# Patient Record
Sex: MALE | Race: WHITE | NOT HISPANIC OR LATINO | Employment: FULL TIME | URBAN - METROPOLITAN AREA
[De-identification: names, ages, dates, MRNs, and addresses within clinical notes are randomized per-mention and may not be internally consistent; named-entity substitution may affect disease eponyms.]

---

## 2017-11-09 ENCOUNTER — HOSPITAL ENCOUNTER (OUTPATIENT)
Facility: MEDICAL CENTER | Age: 42
End: 2017-11-09
Attending: FAMILY MEDICINE
Payer: COMMERCIAL

## 2017-11-09 ENCOUNTER — OFFICE VISIT (OUTPATIENT)
Dept: URGENT CARE | Facility: PHYSICIAN GROUP | Age: 42
End: 2017-11-09
Payer: COMMERCIAL

## 2017-11-09 VITALS
WEIGHT: 207 LBS | TEMPERATURE: 98.9 F | SYSTOLIC BLOOD PRESSURE: 140 MMHG | OXYGEN SATURATION: 94 % | HEART RATE: 89 BPM | HEIGHT: 69 IN | BODY MASS INDEX: 30.66 KG/M2 | DIASTOLIC BLOOD PRESSURE: 90 MMHG | RESPIRATION RATE: 16 BRPM

## 2017-11-09 DIAGNOSIS — L02.91 ABSCESS: ICD-10-CM

## 2017-11-09 PROCEDURE — 87186 SC STD MICRODIL/AGAR DIL: CPT

## 2017-11-09 PROCEDURE — 10060 I&D ABSCESS SIMPLE/SINGLE: CPT | Performed by: FAMILY MEDICINE

## 2017-11-09 PROCEDURE — 87070 CULTURE OTHR SPECIMN AEROBIC: CPT

## 2017-11-09 RX ORDER — OXYCODONE AND ACETAMINOPHEN 10; 325 MG/1; MG/1
1-2 TABLET ORAL EVERY 4 HOURS PRN
COMMUNITY
End: 2021-06-22

## 2017-11-09 RX ORDER — SULFAMETHOXAZOLE AND TRIMETHOPRIM 800; 160 MG/1; MG/1
1 TABLET ORAL EVERY 12 HOURS
Qty: 20 TAB | Refills: 0 | Status: SHIPPED | OUTPATIENT
Start: 2017-11-09 | End: 2017-11-19

## 2017-11-11 DIAGNOSIS — L02.91 ABSCESS: ICD-10-CM

## 2017-11-12 ASSESSMENT — ENCOUNTER SYMPTOMS
SORE THROAT: 0
MYALGIAS: 0
WEIGHT LOSS: 0
NECK PAIN: 0
HEADACHES: 0

## 2017-11-13 LAB
BACTERIA WND AEROBE CULT: ABNORMAL
SIGNIFICANT IND 70042: ABNORMAL
SITE SITE: ABNORMAL
SOURCE SOURCE: ABNORMAL

## 2017-11-13 NOTE — PROGRESS NOTES
"Subjective:      Viktor Mesa is a 42 y.o. male who presents with Abscess (clear liquid, radiating pain, behind left ear, )            Seen 11/9 for 2 days of progressively worse pain, swelling, and suspected abscess behind left ear. No fever. No PMH abscess or MRSA. He suspected initial insect bite but not witnessed. It itched and he scratched it. Scant clear drainage. No other aggravating or alleviating factors.          Review of Systems   Constitutional: Negative for malaise/fatigue and weight loss.   HENT: Negative for sore throat.    Musculoskeletal: Negative for myalgias and neck pain.   Skin: Negative for rash.   Neurological: Negative for headaches.          Objective:     /90   Pulse 89   Temp 37.2 °C (98.9 °F)   Resp 16   Ht 1.753 m (5' 9\")   Wt 93.9 kg (207 lb)   SpO2 94%   BMI 30.57 kg/m²      Physical Exam   Constitutional: He appears well-developed and well-nourished. No distress.   HENT:   Head: Normocephalic and atraumatic.       Right Ear: External ear normal.   Left Ear: External ear normal.   Eyes: Conjunctivae are normal.   Neck: Neck supple.   Cardiovascular: Normal rate, regular rhythm and normal heart sounds.    Pulmonary/Chest: Effort normal and breath sounds normal.   Lymphadenopathy:     He has no cervical adenopathy.   Neurological:   Speech is clear. Patient is appropriate and cooperative.               Procedure: Incision and Drainage  -Risks, benefits, and alternatives discussed. Risks including infection, bleeding, nerve damage, and poor cosmetic outcome  -Sterile technique throughout  -Local anesthesia with 2% lidocaine with epinephrine  -Incision with #11 blade into fluctuant area with purulent material expressed  -Culture obtained and packaged for lab  -Cavity probed and any loculations bluntly taken down with hemostat  -Irrigated copiously with NS  -Minimal bleeding with good hemostasis achieved  -The patient tolerated the procedure well      Assessment/Plan: "     1. Abscess    - sulfamethoxazole-trimethoprim (BACTRIM DS) 800-160 MG tablet; Take 1 Tab by mouth every 12 hours for 10 days.  Dispense: 20 Tab; Refill: 0  - CULTURE WOUND W/O GRAM STAIN; Future  - will f/u culture

## 2017-11-14 ENCOUNTER — TELEPHONE (OUTPATIENT)
Dept: URGENT CARE | Facility: PHYSICIAN GROUP | Age: 42
End: 2017-11-14

## 2019-09-27 ENCOUNTER — OFFICE VISIT (OUTPATIENT)
Dept: BEHAVIORAL HEALTH | Facility: CLINIC | Age: 44
End: 2019-09-27
Payer: COMMERCIAL

## 2019-09-27 DIAGNOSIS — F10.20 ALCOHOL USE DISORDER, MODERATE, DEPENDENCE (HCC): ICD-10-CM

## 2019-09-27 DIAGNOSIS — G89.29 OTHER CHRONIC PAIN: ICD-10-CM

## 2019-09-27 PROCEDURE — 90791 PSYCH DIAGNOSTIC EVALUATION: CPT | Performed by: SOCIAL WORKER

## 2019-09-27 SDOH — HEALTH STABILITY: MENTAL HEALTH: HOW OFTEN DO YOU HAVE 6 OR MORE DRINKS ON ONE OCCASION?: WEEKLY

## 2019-09-27 SDOH — HEALTH STABILITY: MENTAL HEALTH: HOW MANY STANDARD DRINKS CONTAINING ALCOHOL DO YOU HAVE ON A TYPICAL DAY?: 7 TO 9

## 2019-09-27 SDOH — HEALTH STABILITY: MENTAL HEALTH: HOW OFTEN DO YOU HAVE A DRINK CONTAINING ALCOHOL?: 2-3 TIMES A WEEK
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
## 2019-10-11 ENCOUNTER — OFFICE VISIT (OUTPATIENT)
Dept: BEHAVIORAL HEALTH | Facility: CLINIC | Age: 44
End: 2019-10-11
Payer: COMMERCIAL

## 2019-10-11 DIAGNOSIS — G89.4 CHRONIC PAIN SYNDROME: ICD-10-CM

## 2019-10-11 DIAGNOSIS — F10.10 ALCOHOL ABUSE, DAILY USE: ICD-10-CM

## 2019-10-11 PROCEDURE — 90834 PSYTX W PT 45 MINUTES: CPT | Performed by: PSYCHOLOGIST
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
## 2019-11-08 ENCOUNTER — OFFICE VISIT (OUTPATIENT)
Dept: BEHAVIORAL HEALTH | Facility: CLINIC | Age: 44
End: 2019-11-08
Payer: COMMERCIAL

## 2019-11-08 DIAGNOSIS — F10.10 ALCOHOL ABUSE, EPISODIC: ICD-10-CM

## 2019-11-08 DIAGNOSIS — G89.4 CHRONIC PAIN SYNDROME: ICD-10-CM

## 2019-11-08 PROCEDURE — 90834 PSYTX W PT 45 MINUTES: CPT | Performed by: PSYCHOLOGIST

## 2019-11-09 NOTE — BH THERAPY
" [facility]  Therapy Progress Note    Patient Name: Viktor Mesa  Patient MRN: [id number]  Today's Date: 11/8/2019     Type of session:Individual psychotherapy  Length of session: 45 minutes  Persons in attendance:Patient    Subjective/New Info: Patient reports that he has been trying to cut down on drinking less and said that he does not drink all the time and sometimes feels his girlfriend overly magnifies the problem.  Patient went into the description of his girlfriend as having a tragic traumatic history, entering in and out of bad substance abusing relationships, and creating a lot of trauma and being a little \"crazy\" herself.  Examined whether his drinking might be a trigger to some of the bad things that is happened to her, even though he does not think his drinking is that out rages nor is he ever aggressive with her.  Patient went on about the trauma that patient tends to create and inquired into why patient finds himself attracted to \"crazy\" women.    Objective/Observations:   Participation: Active verbal participation, Attentive and Engaged   Grooming: Casual   Cognition: Alert and Fully Oriented   Eye contact: Good   Mood: Euthymic   Affect: Congruent with content   Thought process: Logical and Goal-directed   Speech: Rate within normal limits   Other:     Diagnoses:   1. Alcohol abuse, episodic    2. Chronic pain syndrome         Current risk:   SUICIDE: Low   Homicide: Not applicable   Self-harm: Not applicable   Relapse: Not applicable   Other:    Safety Plan reviewed? Not Indicated   If evidence of imminent risk is present, intervention/plan:     Therapeutic Intervention(s): Clarify:  Clarify feelings and Clarify thoughts, Cognitive modification, Communication skills, Parenting/familial roles addressed, Stressors assessed and Supportive psychotherapy    Treatment Goal(s)/Objective(s) addressed: Tx Goals:  - Learn to be more emotionally flexible/resilient in times of " stress/challenges  - Work an identified program of recovery or harm reduction & relapse prevention  - Successfully learn to regulate impulsive behaviors  - Learn to utilize mind/body techniques toward reducing pain experience       Progress toward Treatment Goals: No change    Plan:  - Continue Individual therapy    Monique Sorot, Ph.D.  11/8/2019    This dictation has been created using voice recognition software and/or scribes. The accuracy of the dictation is limited by the abilities of the software and the expertise of the scribes. I expect there may be some errors of grammar and possibly content. I made every attempt to manually correct the errors within my dictation. However, errors related to voice recognition software and/or scribes may still exist and should be interpreted within the appropriate context.

## 2019-11-19 ENCOUNTER — OFFICE VISIT (OUTPATIENT)
Dept: BEHAVIORAL HEALTH | Facility: CLINIC | Age: 44
End: 2019-11-19
Payer: COMMERCIAL

## 2019-11-19 DIAGNOSIS — G89.4 CHRONIC PAIN SYNDROME: ICD-10-CM

## 2019-11-19 DIAGNOSIS — F10.10 ALCOHOL ABUSE, EPISODIC: ICD-10-CM

## 2019-11-19 PROCEDURE — 90834 PSYTX W PT 45 MINUTES: CPT | Performed by: PSYCHOLOGIST

## 2019-11-20 NOTE — BH THERAPY
[facility]  Therapy Progress Note    Patient Name: Viktor Mesa  Patient MRN: [id number]  Today's Date: 11/19/2019     Type of session:Individual psychotherapy  Length of session: 45 minutes  Persons in attendance:Patient    Subjective/New Info: Explored with patient the goals that he had for therapy, which patient had a hard time identifying.  Patient continues to state that he is in therapy mostly because his girlfriend says his traumatic past is affecting the relationship however patient feels like it is some projection on her part and it is actually her traumatic past that has been affecting the relationship.  Examined patient's drinking which can look like binge drinking at times, however patient does not see this as a problem and states that he is cut back in the last time he did any serious drinking was 3 weeks ago.  Provided psychoeducation about health impact of excessive alcohol use.  Patient is going to think about what he wants to work on in therapy and will decide next session if he wants to continue or not.  Suggested that may be he and his girlfriend go into couples therapy.    Objective/Observations:   Participation: Active verbal participation, Attentive and Engaged   Grooming: Casual   Cognition: Alert and Fully Oriented   Eye contact: Good   Mood: Euthymic   Affect: Congruent with content   Thought process: Logical and Goal-directed   Speech: Rate within normal limits   Other:     Diagnoses:   1. Alcohol abuse, episodic    2. Chronic pain syndrome         Current risk:   SUICIDE: Low   Homicide: Not applicable   Self-harm: Not applicable   Relapse: Not applicable   Other:    Safety Plan reviewed? Not Indicated   If evidence of imminent risk is present, intervention/plan:     Therapeutic Intervention(s): Clarify:  Clarify feelings and Clarify thoughts, Cognitive modification, Communication skills, Parenting/familial roles addressed, Stressors assessed and Supportive  psychotherapy    Treatment Goal(s)/Objective(s) addressed: Tx Goals:  - Learn to be more emotionally flexible/resilient in times of stress/challenges  - Work an identified program of recovery or harm reduction & relapse prevention  - Successfully learn to regulate impulsive behaviors  - Learn to utilize mind/body techniques toward reducing pain experience       Progress toward Treatment Goals: No change    Plan:  - Continue Individual therapy    Monique Sorto, Ph.D.  11/19/2019    This dictation has been created using voice recognition software and/or scribes. The accuracy of the dictation is limited by the abilities of the software and the expertise of the scribes. I expect there may be some errors of grammar and possibly content. I made every attempt to manually correct the errors within my dictation. However, errors related to voice recognition software and/or scribes may still exist and should be interpreted within the appropriate context.

## 2019-12-20 ENCOUNTER — APPOINTMENT (OUTPATIENT)
Dept: BEHAVIORAL HEALTH | Facility: CLINIC | Age: 44
End: 2019-12-20
Payer: COMMERCIAL

## 2020-12-23 ENCOUNTER — HOSPITAL ENCOUNTER (OUTPATIENT)
Dept: LAB | Facility: MEDICAL CENTER | Age: 45
End: 2020-12-23
Attending: ANESTHESIOLOGY
Payer: COMMERCIAL

## 2020-12-23 LAB
COVID ORDER STATUS COVID19: NORMAL
SARS-COV-2 RNA RESP QL NAA+PROBE: NOTDETECTED
SPECIMEN SOURCE: NORMAL

## 2020-12-23 PROCEDURE — U0003 INFECTIOUS AGENT DETECTION BY NUCLEIC ACID (DNA OR RNA); SEVERE ACUTE RESPIRATORY SYNDROME CORONAVIRUS 2 (SARS-COV-2) (CORONAVIRUS DISEASE [COVID-19]), AMPLIFIED PROBE TECHNIQUE, MAKING USE OF HIGH THROUGHPUT TECHNOLOGIES AS DESCRIBED BY CMS-2020-01-R: HCPCS

## 2020-12-23 PROCEDURE — C9803 HOPD COVID-19 SPEC COLLECT: HCPCS

## 2021-06-16 PROBLEM — M54.12 RADICULITIS, CERVICAL: Status: ACTIVE | Noted: 2021-06-16

## 2021-06-16 PROBLEM — M50.30 DDD (DEGENERATIVE DISC DISEASE), CERVICAL: Status: ACTIVE | Noted: 2021-06-16

## 2021-06-16 PROBLEM — M79.10 MYALGIA: Status: ACTIVE | Noted: 2021-06-16

## 2021-06-22 RX ORDER — ASPIRIN 325 MG
325 TABLET ORAL DAILY
COMMUNITY
End: 2021-09-03

## 2021-07-08 ENCOUNTER — APPOINTMENT (OUTPATIENT)
Dept: RADIOLOGY | Facility: REHABILITATION | Age: 46
End: 2021-07-08
Attending: PHYSICAL MEDICINE & REHABILITATION
Payer: COMMERCIAL

## 2021-07-08 ENCOUNTER — HOSPITAL ENCOUNTER (OUTPATIENT)
Facility: REHABILITATION | Age: 46
End: 2021-07-08
Attending: PHYSICAL MEDICINE & REHABILITATION | Admitting: PHYSICAL MEDICINE & REHABILITATION
Payer: COMMERCIAL

## 2021-07-08 VITALS
DIASTOLIC BLOOD PRESSURE: 104 MMHG | OXYGEN SATURATION: 97 % | HEART RATE: 68 BPM | WEIGHT: 205.03 LBS | SYSTOLIC BLOOD PRESSURE: 146 MMHG | TEMPERATURE: 97.6 F | RESPIRATION RATE: 16 BRPM | BODY MASS INDEX: 30.37 KG/M2 | HEIGHT: 69 IN

## 2021-07-08 PROCEDURE — 62321 NJX INTERLAMINAR CRV/THRC: CPT

## 2021-07-08 PROCEDURE — 700117 HCHG RX CONTRAST REV CODE 255

## 2021-07-08 PROCEDURE — 700111 HCHG RX REV CODE 636 W/ 250 OVERRIDE (IP)

## 2021-07-08 RX ORDER — METHYLPREDNISOLONE ACETATE 80 MG/ML
INJECTION, SUSPENSION INTRA-ARTICULAR; INTRALESIONAL; INTRAMUSCULAR; SOFT TISSUE
Status: COMPLETED
Start: 2021-07-08 | End: 2021-07-08

## 2021-07-08 RX ADMIN — METHYLPREDNISOLONE ACETATE 80 MG: 80 INJECTION, SUSPENSION INTRA-ARTICULAR; INTRALESIONAL; INTRAMUSCULAR; SOFT TISSUE at 14:43

## 2021-07-08 RX ADMIN — IOHEXOL 3 ML: 240 INJECTION, SOLUTION INTRATHECAL; INTRAVASCULAR; INTRAVENOUS; ORAL at 14:43

## 2021-07-08 ASSESSMENT — PAIN DESCRIPTION - PAIN TYPE: TYPE: CHRONIC PAIN

## 2021-07-08 NOTE — PROGRESS NOTES
Handoff received from pre-procedure RN. Pre assessment complete with pertinent history reviewed (Healthy).  Stop bang = 5.  Pt positioned prone by CST, RN, XRT, ankles resting on pillow, hands resting on stool under head of procedure table.    normal...

## 2021-07-08 NOTE — OR SURGEON
Immediate Post OP Note    PreOp Diagnosis: Cervical radiculopathy right upper extremity with significant numbness throughout the arm      PostOp Diagnosis:  Cervical radiculopathy right upper extremity with significant numbness throughout the arm      Procedure(s): Right C6-7 epidural utilizing fluoroscopy and conscious sedation for safety and anxiety greater than 10 minutes  Cervical epidural - Wound Class: Clean    Surgeon(s):  Cesario Sage M.D.    Anesthesiologist/Type of Anesthesia:  No anesthesia staff entered./Local    Surgical Staff:  Circulator: Macarena Springer R.N.  Scrub Person: Rosemary Oliva C.N.A.  Radiology Technologist: Guero Calderon    Specimens removed if any:  * No specimens in log *    Estimated Blood Loss: None     Findings: Normal    Complications: None         7/8/2021 3:06 PM Cesario Sage M.D.

## 2021-07-08 NOTE — OP REPORT
Pre-operative Diagnosis: Cervical radiculopathy right    Post-operative Diagnosis: Cervical radiculopathy right    Procedure: Cervical interlaminar epidural steroid injection right C6-7 epidural utilizing fluoroscopy and conscious sedation for safety and anxiety greater than 10 minutes    Description of procedure:    The risks, benefits, and alternatives of the procedure were reviewed and discussed with the patient. Written informed consent was freely obtained. A pre-procedural time-out was conducted by the physician verifying patient’s identity, procedure to be performed, procedure site and side, and allergy verification. Appropriate equipment was determined to be in place for the procedure.     An IV was placed peripherally, and the patient received a low dose of IV Versed for anxiolysis with a low dose of IV Fentanyl for pain control. The patient's vital signs were carefully monitored before, throughout, and after the procedure.     In the fluoroscopy suite the patient was placed in a prone position, a pillow placed underneath their chest. The skin was prepped and draped in the usual sterile fashion. The fluoroscope was placed over the cervical neck at the appropriate injection AP angle view, and the target for injection was marked. A 25g needle was placed into the marked site, and approx 0.5cc of 1% Lidocaine was injected subcutaneously into the epidermal and dermal layers. The needle was removed. A 20g Tuohy needle was then placed and advanced under fluoroscopic guidance to the right C7 lamina and advanced to using loss-of-resistance technique into the epidural space at the right C6-7. Contrast dye was then injected after negative aspiration good visualization of the epidural space was noted on fluoroscopic imaging. Following negative aspiration, approx 5cc of 0.9% NS preservative free with 80 mg of Depo-Medrol was then injected without any resistance and free flow. The patient tolerated this very well the  needle was then removed and the paraspinal muscles and a half cc 1% lidocaine was injected after negative aspiration and it was then completely withdrawn and area cleansed The patient's back was covered with a 4x4 gauze, the area was cleansed with sterile normal saline, and a dressing was applied. There were no complications noted.     The patient was then evaluated post-procedure, and was hemodynamically stable prior to leaving the post-operative care unit.     Andrew Sage MD  PM&R/Pain Mgmt

## 2021-07-08 NOTE — PROGRESS NOTES
Pt identified. Vitals recorded. Consent signed and procedure verified with patient. Education overview. H&P is updated and pre assessment is documented. PMH and medications reviewed. No Acs or HTN meds taken.

## 2021-07-08 NOTE — PROGRESS NOTES
Pt discharged home with care from significant other. Vital signs and post assessment documented. Education reviewed and all questions answered. Pt able to ambulate appropriately. All belongings returned to patient.     Jannet Holt R.N.

## 2021-07-08 NOTE — H&P
Physical Medicine & Rehab History & Physical Note    Date  7/8/2021    Primary Care Physician  DIA Tong  Pre-Op Diagnosis Codes:     * Radiculopathy, cervical region [M54.12]    HPI  This is a 46 y.o. male who presented with neck pain limiting motion with a referral pattern down bilateral upper extremities.  He denies any fevers chills or night sweats he is able going forward flexion and extension okay its extension with lateral bend that bothers him the most    History reviewed. No pertinent past medical history.    History reviewed. No pertinent surgical history.    No current facility-administered medications for this encounter.       Social History     Socioeconomic History   • Marital status:      Spouse name: Not on file   • Number of children: Not on file   • Years of education: Not on file   • Highest education level: Not on file   Occupational History   • Not on file   Tobacco Use   • Smoking status: Current Every Day Smoker     Packs/day: 0.25     Types: Cigarettes   • Smokeless tobacco: Current User     Types: Chew   Vaping Use   • Vaping Use: Former   Substance and Sexual Activity   • Alcohol use: Yes     Alcohol/week: 4.8 oz     Types: 8 Cans of beer per week   • Drug use: Not Currently   • Sexual activity: Yes     Partners: Female   Other Topics Concern   • Not on file   Social History Narrative   • Not on file     Social Determinants of Health     Financial Resource Strain:    • Difficulty of Paying Living Expenses:    Food Insecurity:    • Worried About Running Out of Food in the Last Year:    • Ran Out of Food in the Last Year:    Transportation Needs:    • Lack of Transportation (Medical):    • Lack of Transportation (Non-Medical):    Physical Activity:    • Days of Exercise per Week:    • Minutes of Exercise per Session:    Stress:    • Feeling of Stress :    Social Connections:    • Frequency of Communication with Friends and Family:    • Frequency of Social Gatherings  with Friends and Family:    • Attends [employer] Services:    • Active Member of Clubs or Organizations:    • Attends Club or Organization Meetings:    • Marital Status:    Intimate Partner Violence:    • Fear of Current or Ex-Partner:    • Emotionally Abused:    • Physically Abused:    • Sexually Abused:        History reviewed. No pertinent family history.    Allergies  Patient has no known allergies.    Review of Systems  Negative    Physical Exam  Well-developed well-nourished gentleman no apparent distress cranial nerves II through XII grossly intact by symmetrically Spurling's is positive with referral pattern all the way down bilateral upper extremities to his middle finger.  No calf tenderness respiratory 16 no labored breathing skin no rashes  Vital Signs  Blood Pressure: 157/98   Temperature: 36.4 °C (97.6 °F)   Pulse: 62   Respiration: 16   Pulse Oximetry: 97 %       Labs:                    Radiology:  DX-PORTABLE FLUOROSCOPY < 1 HOUR    (Results Pending)         Assessment/Plan:  Pre-Op Diagnosis Codes:     * Radiculopathy, cervical region [M54.12]  Procedure(s):  Cervical epidural

## 2021-09-03 ENCOUNTER — PRE-ADMISSION TESTING (OUTPATIENT)
Dept: ADMISSIONS | Facility: MEDICAL CENTER | Age: 46
End: 2021-09-03
Attending: NEUROLOGICAL SURGERY
Payer: COMMERCIAL

## 2021-09-03 VITALS — WEIGHT: 218.03 LBS | BODY MASS INDEX: 32.29 KG/M2 | HEIGHT: 69 IN

## 2021-09-03 DIAGNOSIS — M54.12 CERVICAL RADICULOPATHY: ICD-10-CM

## 2021-09-03 DIAGNOSIS — Z01.812 PRE-OPERATIVE LABORATORY EXAMINATION: ICD-10-CM

## 2021-09-03 DIAGNOSIS — Z01.810 PRE-OPERATIVE CARDIOVASCULAR EXAMINATION: ICD-10-CM

## 2021-09-03 DIAGNOSIS — M48.02 CERVICAL STENOSIS OF SPINE: ICD-10-CM

## 2021-09-03 LAB
25(OH)D3 SERPL-MCNC: 20 NG/ML (ref 30–100)
ABO GROUP BLD: NORMAL
ANION GAP SERPL CALC-SCNC: 15 MMOL/L (ref 7–16)
APPEARANCE UR: CLEAR
APTT PPP: 27.7 SEC (ref 24.7–36)
BASOPHILS # BLD AUTO: 0.5 % (ref 0–1.8)
BASOPHILS # BLD: 0.04 K/UL (ref 0–0.12)
BILIRUB UR QL STRIP.AUTO: NEGATIVE
BLD GP AB SCN SERPL QL: NORMAL
BUN SERPL-MCNC: 17 MG/DL (ref 8–22)
CALCIUM SERPL-MCNC: 9.5 MG/DL (ref 8.5–10.5)
CHLORIDE SERPL-SCNC: 102 MMOL/L (ref 96–112)
CO2 SERPL-SCNC: 24 MMOL/L (ref 20–33)
COLOR UR: YELLOW
CREAT SERPL-MCNC: 0.78 MG/DL (ref 0.5–1.4)
EKG IMPRESSION: NORMAL
EOSINOPHIL # BLD AUTO: 0.22 K/UL (ref 0–0.51)
EOSINOPHIL NFR BLD: 2.5 % (ref 0–6.9)
ERYTHROCYTE [DISTWIDTH] IN BLOOD BY AUTOMATED COUNT: 44.6 FL (ref 35.9–50)
GLUCOSE SERPL-MCNC: 91 MG/DL (ref 65–99)
GLUCOSE UR STRIP.AUTO-MCNC: NEGATIVE MG/DL
HCT VFR BLD AUTO: 43.9 % (ref 42–52)
HGB BLD-MCNC: 14.7 G/DL (ref 14–18)
IMM GRANULOCYTES # BLD AUTO: 0.04 K/UL (ref 0–0.11)
IMM GRANULOCYTES NFR BLD AUTO: 0.5 % (ref 0–0.9)
INR PPP: 0.95 (ref 0.87–1.13)
KETONES UR STRIP.AUTO-MCNC: NEGATIVE MG/DL
LEUKOCYTE ESTERASE UR QL STRIP.AUTO: NEGATIVE
LYMPHOCYTES # BLD AUTO: 2.58 K/UL (ref 1–4.8)
LYMPHOCYTES NFR BLD: 29.8 % (ref 22–41)
MCH RBC QN AUTO: 31.2 PG (ref 27–33)
MCHC RBC AUTO-ENTMCNC: 33.5 G/DL (ref 33.7–35.3)
MCV RBC AUTO: 93.2 FL (ref 81.4–97.8)
MICRO URNS: NORMAL
MONOCYTES # BLD AUTO: 0.84 K/UL (ref 0–0.85)
MONOCYTES NFR BLD AUTO: 9.7 % (ref 0–13.4)
NEUTROPHILS # BLD AUTO: 4.93 K/UL (ref 1.82–7.42)
NEUTROPHILS NFR BLD: 57 % (ref 44–72)
NITRITE UR QL STRIP.AUTO: NEGATIVE
NRBC # BLD AUTO: 0 K/UL
NRBC BLD-RTO: 0 /100 WBC
PH UR STRIP.AUTO: 6 [PH] (ref 5–8)
PLATELET # BLD AUTO: 299 K/UL (ref 164–446)
PMV BLD AUTO: 11.1 FL (ref 9–12.9)
POTASSIUM SERPL-SCNC: 4 MMOL/L (ref 3.6–5.5)
PROT UR QL STRIP: NEGATIVE MG/DL
PROTHROMBIN TIME: 12.4 SEC (ref 12–14.6)
RBC # BLD AUTO: 4.71 M/UL (ref 4.7–6.1)
RBC UR QL AUTO: NEGATIVE
RH BLD: NORMAL
SODIUM SERPL-SCNC: 141 MMOL/L (ref 135–145)
SP GR UR STRIP.AUTO: 1.02
UROBILINOGEN UR STRIP.AUTO-MCNC: 0.2 MG/DL
WBC # BLD AUTO: 8.7 K/UL (ref 4.8–10.8)

## 2021-09-03 PROCEDURE — 81003 URINALYSIS AUTO W/O SCOPE: CPT

## 2021-09-03 PROCEDURE — 80048 BASIC METABOLIC PNL TOTAL CA: CPT

## 2021-09-03 PROCEDURE — 86900 BLOOD TYPING SEROLOGIC ABO: CPT

## 2021-09-03 PROCEDURE — 85730 THROMBOPLASTIN TIME PARTIAL: CPT

## 2021-09-03 PROCEDURE — 82306 VITAMIN D 25 HYDROXY: CPT

## 2021-09-03 PROCEDURE — 85610 PROTHROMBIN TIME: CPT

## 2021-09-03 PROCEDURE — 93005 ELECTROCARDIOGRAM TRACING: CPT

## 2021-09-03 PROCEDURE — 86901 BLOOD TYPING SEROLOGIC RH(D): CPT

## 2021-09-03 PROCEDURE — 93010 ELECTROCARDIOGRAM REPORT: CPT | Performed by: INTERNAL MEDICINE

## 2021-09-03 PROCEDURE — 86850 RBC ANTIBODY SCREEN: CPT

## 2021-09-03 PROCEDURE — 85025 COMPLETE CBC W/AUTO DIFF WBC: CPT

## 2021-09-03 PROCEDURE — 36415 COLL VENOUS BLD VENIPUNCTURE: CPT

## 2021-09-03 RX ORDER — LOSARTAN POTASSIUM 25 MG/1
25 TABLET ORAL
COMMUNITY
Start: 2021-07-13

## 2021-09-03 RX ORDER — DOCUSATE SODIUM 100 MG/1
100 CAPSULE, LIQUID FILLED ORAL EVERY MORNING
COMMUNITY

## 2021-09-03 RX ORDER — OMEPRAZOLE 20 MG/1
20 CAPSULE, DELAYED RELEASE ORAL EVERY MORNING
COMMUNITY
Start: 2021-07-13

## 2021-09-17 ENCOUNTER — PRE-ADMISSION TESTING (OUTPATIENT)
Dept: ADMISSIONS | Facility: MEDICAL CENTER | Age: 46
End: 2021-09-17
Attending: NEUROLOGICAL SURGERY
Payer: COMMERCIAL

## 2021-09-17 DIAGNOSIS — Z01.812 PRE-OPERATIVE LABORATORY EXAMINATION: ICD-10-CM

## 2021-09-17 LAB — COVID ORDER STATUS COVID19: NORMAL

## 2021-09-17 PROCEDURE — U0003 INFECTIOUS AGENT DETECTION BY NUCLEIC ACID (DNA OR RNA); SEVERE ACUTE RESPIRATORY SYNDROME CORONAVIRUS 2 (SARS-COV-2) (CORONAVIRUS DISEASE [COVID-19]), AMPLIFIED PROBE TECHNIQUE, MAKING USE OF HIGH THROUGHPUT TECHNOLOGIES AS DESCRIBED BY CMS-2020-01-R: HCPCS

## 2021-09-17 PROCEDURE — U0005 INFEC AGEN DETEC AMPLI PROBE: HCPCS

## 2021-09-19 LAB
SARS-COV-2 RNA RESP QL NAA+PROBE: NOTDETECTED
SPECIMEN SOURCE: NORMAL

## 2021-09-21 ENCOUNTER — ANESTHESIA EVENT (OUTPATIENT)
Dept: SURGERY | Facility: MEDICAL CENTER | Age: 46
End: 2021-09-21
Payer: COMMERCIAL

## 2021-09-21 NOTE — OR NURSING
COVID-19 Pre-surgery screenin. Do you have an undiagnosed respiratory illness or symptoms such as coughing or sneezing? No  a. Onset of Sx  n/a  b. Acute vs. chronic respiratory illness  n/a    2. Do you have an unexplained fever greater than 100.4 degrees Fahrenheit or 38 degrees Celsius?  No        3. Have you had direct exposure to a patient who tested positive for Covid-19?  No        4. Have you had any loss of your sense of taste or smell, N/V or sore throat?  No    Patient has been informed of 2 visitor policy and asked to wear a mask at all times.  Eating or drinking are only allowed in dining areas.   Yes

## 2021-09-21 NOTE — OR NURSING
COVID-19 Pre-surgery screening:    Left message to call back for screening, and advised of mask/visitor policies

## 2021-09-22 ENCOUNTER — ANESTHESIA (OUTPATIENT)
Dept: SURGERY | Facility: MEDICAL CENTER | Age: 46
End: 2021-09-22
Payer: COMMERCIAL

## 2021-09-22 ENCOUNTER — APPOINTMENT (OUTPATIENT)
Dept: RADIOLOGY | Facility: MEDICAL CENTER | Age: 46
End: 2021-09-22
Attending: NEUROLOGICAL SURGERY
Payer: COMMERCIAL

## 2021-09-22 ENCOUNTER — HOSPITAL ENCOUNTER (OUTPATIENT)
Facility: MEDICAL CENTER | Age: 46
End: 2021-09-23
Attending: NEUROLOGICAL SURGERY | Admitting: NEUROLOGICAL SURGERY
Payer: COMMERCIAL

## 2021-09-22 DIAGNOSIS — M54.12 RADICULITIS, CERVICAL: ICD-10-CM

## 2021-09-22 DIAGNOSIS — G89.18 POSTOPERATIVE PAIN AFTER SPINAL SURGERY: ICD-10-CM

## 2021-09-22 LAB
ABO + RH BLD: NORMAL
ALBUMIN SERPL BCP-MCNC: 4.4 G/DL (ref 3.2–4.9)
ALBUMIN/GLOB SERPL: 1.7 G/DL
ALP SERPL-CCNC: 60 U/L (ref 30–99)
ALT SERPL-CCNC: 29 U/L (ref 2–50)
ANION GAP SERPL CALC-SCNC: 17 MMOL/L (ref 7–16)
AST SERPL-CCNC: 33 U/L (ref 12–45)
BILIRUB SERPL-MCNC: 0.2 MG/DL (ref 0.1–1.5)
BUN SERPL-MCNC: 9 MG/DL (ref 8–22)
CALCIUM SERPL-MCNC: 9.1 MG/DL (ref 8.5–10.5)
CHLORIDE SERPL-SCNC: 102 MMOL/L (ref 96–112)
CO2 SERPL-SCNC: 18 MMOL/L (ref 20–33)
CREAT SERPL-MCNC: 0.87 MG/DL (ref 0.5–1.4)
GLOBULIN SER CALC-MCNC: 2.6 G/DL (ref 1.9–3.5)
GLUCOSE SERPL-MCNC: 190 MG/DL (ref 65–99)
POTASSIUM SERPL-SCNC: 4.4 MMOL/L (ref 3.6–5.5)
PROT SERPL-MCNC: 7 G/DL (ref 6–8.2)
SODIUM SERPL-SCNC: 137 MMOL/L (ref 135–145)

## 2021-09-22 PROCEDURE — 500367 HCHG DRAIN KIT, HEMOVAC: Performed by: NEUROLOGICAL SURGERY

## 2021-09-22 PROCEDURE — 500864 HCHG NEEDLE, SPINAL 18G: Performed by: NEUROLOGICAL SURGERY

## 2021-09-22 PROCEDURE — 700101 HCHG RX REV CODE 250: Performed by: NEUROLOGICAL SURGERY

## 2021-09-22 PROCEDURE — 80053 COMPREHEN METABOLIC PANEL: CPT

## 2021-09-22 PROCEDURE — 95937 NEUROMUSCULAR JUNCTION TEST: CPT | Performed by: NEUROLOGICAL SURGERY

## 2021-09-22 PROCEDURE — 22856 TOT DISC ARTHRP 1NTRSPC CRV: CPT | Performed by: NEUROLOGICAL SURGERY

## 2021-09-22 PROCEDURE — 95938 SOMATOSENSORY TESTING: CPT | Performed by: NEUROLOGICAL SURGERY

## 2021-09-22 PROCEDURE — 700105 HCHG RX REV CODE 258: Performed by: NEUROLOGICAL SURGERY

## 2021-09-22 PROCEDURE — C1889 IMPLANT/INSERT DEVICE, NOC: HCPCS | Performed by: NEUROLOGICAL SURGERY

## 2021-09-22 PROCEDURE — A9270 NON-COVERED ITEM OR SERVICE: HCPCS | Performed by: PHYSICIAN ASSISTANT

## 2021-09-22 PROCEDURE — 96367 TX/PROPH/DG ADDL SEQ IV INF: CPT

## 2021-09-22 PROCEDURE — 160009 HCHG ANES TIME/MIN: Performed by: NEUROLOGICAL SURGERY

## 2021-09-22 PROCEDURE — 700111 HCHG RX REV CODE 636 W/ 250 OVERRIDE (IP): Performed by: NEUROLOGICAL SURGERY

## 2021-09-22 PROCEDURE — 22858 TOT DISC ARTHRP 2ND LVL CRV: CPT | Mod: ASROC | Performed by: PHYSICIAN ASSISTANT

## 2021-09-22 PROCEDURE — 700105 HCHG RX REV CODE 258: Performed by: PHYSICIAN ASSISTANT

## 2021-09-22 PROCEDURE — 700111 HCHG RX REV CODE 636 W/ 250 OVERRIDE (IP): Performed by: ANESTHESIOLOGY

## 2021-09-22 PROCEDURE — 95861 NEEDLE EMG 2 EXTREMITIES: CPT | Performed by: NEUROLOGICAL SURGERY

## 2021-09-22 PROCEDURE — 160035 HCHG PACU - 1ST 60 MINS PHASE I: Performed by: NEUROLOGICAL SURGERY

## 2021-09-22 PROCEDURE — 700106 HCHG RX REV CODE 271: Performed by: NEUROLOGICAL SURGERY

## 2021-09-22 PROCEDURE — 700101 HCHG RX REV CODE 250: Performed by: PHYSICIAN ASSISTANT

## 2021-09-22 PROCEDURE — A9270 NON-COVERED ITEM OR SERVICE: HCPCS | Performed by: ANESTHESIOLOGY

## 2021-09-22 PROCEDURE — 95939 C MOTOR EVOKED UPR&LWR LIMBS: CPT | Performed by: NEUROLOGICAL SURGERY

## 2021-09-22 PROCEDURE — 95940 IONM IN OPERATNG ROOM 15 MIN: CPT | Performed by: NEUROLOGICAL SURGERY

## 2021-09-22 PROCEDURE — 22856 TOT DISC ARTHRP 1NTRSPC CRV: CPT | Mod: ASROC | Performed by: PHYSICIAN ASSISTANT

## 2021-09-22 PROCEDURE — 96375 TX/PRO/DX INJ NEW DRUG ADDON: CPT

## 2021-09-22 PROCEDURE — 700102 HCHG RX REV CODE 250 W/ 637 OVERRIDE(OP): Performed by: PHYSICIAN ASSISTANT

## 2021-09-22 PROCEDURE — 700101 HCHG RX REV CODE 250: Performed by: ANESTHESIOLOGY

## 2021-09-22 PROCEDURE — 160029 HCHG SURGERY MINUTES - 1ST 30 MINS LEVEL 4: Performed by: NEUROLOGICAL SURGERY

## 2021-09-22 PROCEDURE — 700111 HCHG RX REV CODE 636 W/ 250 OVERRIDE (IP): Performed by: PHYSICIAN ASSISTANT

## 2021-09-22 PROCEDURE — 700102 HCHG RX REV CODE 250 W/ 637 OVERRIDE(OP): Performed by: ANESTHESIOLOGY

## 2021-09-22 PROCEDURE — 22858 TOT DISC ARTHRP 2ND LVL CRV: CPT | Performed by: NEUROLOGICAL SURGERY

## 2021-09-22 PROCEDURE — 96365 THER/PROPH/DIAG IV INF INIT: CPT

## 2021-09-22 PROCEDURE — 700105 HCHG RX REV CODE 258: Performed by: ANESTHESIOLOGY

## 2021-09-22 PROCEDURE — 160048 HCHG OR STATISTICAL LEVEL 1-5: Performed by: NEUROLOGICAL SURGERY

## 2021-09-22 PROCEDURE — 160002 HCHG RECOVERY MINUTES (STAT): Performed by: NEUROLOGICAL SURGERY

## 2021-09-22 PROCEDURE — 72040 X-RAY EXAM NECK SPINE 2-3 VW: CPT

## 2021-09-22 PROCEDURE — G0378 HOSPITAL OBSERVATION PER HR: HCPCS

## 2021-09-22 PROCEDURE — 110454 HCHG SHELL REV 250: Performed by: NEUROLOGICAL SURGERY

## 2021-09-22 PROCEDURE — 36415 COLL VENOUS BLD VENIPUNCTURE: CPT

## 2021-09-22 PROCEDURE — 501838 HCHG SUTURE GENERAL: Performed by: NEUROLOGICAL SURGERY

## 2021-09-22 PROCEDURE — 160036 HCHG PACU - EA ADDL 30 MINS PHASE I: Performed by: NEUROLOGICAL SURGERY

## 2021-09-22 PROCEDURE — C1713 ANCHOR/SCREW BN/BN,TIS/BN: HCPCS | Performed by: NEUROLOGICAL SURGERY

## 2021-09-22 PROCEDURE — 96366 THER/PROPH/DIAG IV INF ADDON: CPT

## 2021-09-22 PROCEDURE — 502000 HCHG MISC OR IMPLANTS RC 0278: Performed by: NEUROLOGICAL SURGERY

## 2021-09-22 PROCEDURE — 160041 HCHG SURGERY MINUTES - EA ADDL 1 MIN LEVEL 4: Performed by: NEUROLOGICAL SURGERY

## 2021-09-22 DEVICE — IMPLANTABLE DEVICE: Type: IMPLANTABLE DEVICE | Site: SPINE CERVICAL | Status: FUNCTIONAL

## 2021-09-22 RX ORDER — AMOXICILLIN 250 MG
1 CAPSULE ORAL NIGHTLY
Status: DISCONTINUED | OUTPATIENT
Start: 2021-09-22 | End: 2021-09-23 | Stop reason: HOSPADM

## 2021-09-22 RX ORDER — CEFAZOLIN SODIUM 1 G/3ML
INJECTION, POWDER, FOR SOLUTION INTRAMUSCULAR; INTRAVENOUS
Status: DISCONTINUED | OUTPATIENT
Start: 2021-09-22 | End: 2021-09-22 | Stop reason: HOSPADM

## 2021-09-22 RX ORDER — ROCURONIUM BROMIDE 10 MG/ML
INJECTION, SOLUTION INTRAVENOUS PRN
Status: DISCONTINUED | OUTPATIENT
Start: 2021-09-22 | End: 2021-09-22 | Stop reason: SURG

## 2021-09-22 RX ORDER — ACETAMINOPHEN 500 MG
1000 TABLET ORAL ONCE
Status: COMPLETED | OUTPATIENT
Start: 2021-09-22 | End: 2021-09-22

## 2021-09-22 RX ORDER — METOCLOPRAMIDE HYDROCHLORIDE 5 MG/ML
INJECTION INTRAMUSCULAR; INTRAVENOUS PRN
Status: DISCONTINUED | OUTPATIENT
Start: 2021-09-22 | End: 2021-09-22 | Stop reason: SURG

## 2021-09-22 RX ORDER — HYDROMORPHONE HYDROCHLORIDE 2 MG/ML
INJECTION, SOLUTION INTRAMUSCULAR; INTRAVENOUS; SUBCUTANEOUS PRN
Status: DISCONTINUED | OUTPATIENT
Start: 2021-09-22 | End: 2021-09-22 | Stop reason: SURG

## 2021-09-22 RX ORDER — ALPRAZOLAM 0.25 MG/1
0.25 TABLET ORAL 2 TIMES DAILY PRN
Status: DISCONTINUED | OUTPATIENT
Start: 2021-09-22 | End: 2021-09-23 | Stop reason: HOSPADM

## 2021-09-22 RX ORDER — BUPIVACAINE HYDROCHLORIDE AND EPINEPHRINE 5; 5 MG/ML; UG/ML
INJECTION, SOLUTION EPIDURAL; INTRACAUDAL; PERINEURAL
Status: DISCONTINUED | OUTPATIENT
Start: 2021-09-22 | End: 2021-09-22 | Stop reason: HOSPADM

## 2021-09-22 RX ORDER — GABAPENTIN 100 MG/1
100 CAPSULE ORAL ONCE
Status: COMPLETED | OUTPATIENT
Start: 2021-09-22 | End: 2021-09-22

## 2021-09-22 RX ORDER — ONDANSETRON 2 MG/ML
4 INJECTION INTRAMUSCULAR; INTRAVENOUS EVERY 4 HOURS PRN
Status: DISCONTINUED | OUTPATIENT
Start: 2021-09-22 | End: 2021-09-23 | Stop reason: HOSPADM

## 2021-09-22 RX ORDER — DOCUSATE SODIUM 100 MG/1
100 CAPSULE, LIQUID FILLED ORAL EVERY MORNING
Status: DISCONTINUED | OUTPATIENT
Start: 2021-09-23 | End: 2021-09-23 | Stop reason: HOSPADM

## 2021-09-22 RX ORDER — ENEMA 19; 7 G/133ML; G/133ML
1 ENEMA RECTAL
Status: DISCONTINUED | OUTPATIENT
Start: 2021-09-22 | End: 2021-09-23 | Stop reason: HOSPADM

## 2021-09-22 RX ORDER — DEXAMETHASONE SODIUM PHOSPHATE 4 MG/ML
INJECTION, SOLUTION INTRA-ARTICULAR; INTRALESIONAL; INTRAMUSCULAR; INTRAVENOUS; SOFT TISSUE PRN
Status: DISCONTINUED | OUTPATIENT
Start: 2021-09-22 | End: 2021-09-22 | Stop reason: SURG

## 2021-09-22 RX ORDER — SODIUM CHLORIDE, SODIUM LACTATE, POTASSIUM CHLORIDE, CALCIUM CHLORIDE 600; 310; 30; 20 MG/100ML; MG/100ML; MG/100ML; MG/100ML
INJECTION, SOLUTION INTRAVENOUS
Status: DISCONTINUED | OUTPATIENT
Start: 2021-09-22 | End: 2021-09-22 | Stop reason: SURG

## 2021-09-22 RX ORDER — SUCCINYLCHOLINE CHLORIDE 20 MG/ML
INJECTION INTRAMUSCULAR; INTRAVENOUS PRN
Status: DISCONTINUED | OUTPATIENT
Start: 2021-09-22 | End: 2021-09-22 | Stop reason: SURG

## 2021-09-22 RX ORDER — HYDROMORPHONE HYDROCHLORIDE 1 MG/ML
0.2 INJECTION, SOLUTION INTRAMUSCULAR; INTRAVENOUS; SUBCUTANEOUS
Status: DISCONTINUED | OUTPATIENT
Start: 2021-09-22 | End: 2021-09-22 | Stop reason: HOSPADM

## 2021-09-22 RX ORDER — DEXAMETHASONE SODIUM PHOSPHATE 4 MG/ML
4 INJECTION, SOLUTION INTRA-ARTICULAR; INTRALESIONAL; INTRAMUSCULAR; INTRAVENOUS; SOFT TISSUE EVERY 6 HOURS
Status: COMPLETED | OUTPATIENT
Start: 2021-09-22 | End: 2021-09-23

## 2021-09-22 RX ORDER — LABETALOL HYDROCHLORIDE 5 MG/ML
10 INJECTION, SOLUTION INTRAVENOUS
Status: DISCONTINUED | OUTPATIENT
Start: 2021-09-22 | End: 2021-09-23 | Stop reason: HOSPADM

## 2021-09-22 RX ORDER — PROMETHAZINE HYDROCHLORIDE 25 MG/1
12.5-25 TABLET ORAL EVERY 4 HOURS PRN
Status: DISCONTINUED | OUTPATIENT
Start: 2021-09-22 | End: 2021-09-23 | Stop reason: HOSPADM

## 2021-09-22 RX ORDER — ALBUTEROL SULFATE 90 UG/1
2 AEROSOL, METERED RESPIRATORY (INHALATION) PRN
COMMUNITY

## 2021-09-22 RX ORDER — ACETAMINOPHEN 500 MG
1000 TABLET ORAL EVERY 6 HOURS
Status: DISCONTINUED | OUTPATIENT
Start: 2021-09-22 | End: 2021-09-23 | Stop reason: HOSPADM

## 2021-09-22 RX ORDER — OXYCODONE HCL 5 MG/5 ML
5 SOLUTION, ORAL ORAL
Status: COMPLETED | OUTPATIENT
Start: 2021-09-22 | End: 2021-09-22

## 2021-09-22 RX ORDER — DIPHENHYDRAMINE HCL 25 MG
25 TABLET ORAL EVERY 6 HOURS PRN
Status: DISCONTINUED | OUTPATIENT
Start: 2021-09-22 | End: 2021-09-23 | Stop reason: HOSPADM

## 2021-09-22 RX ORDER — DIPHENHYDRAMINE HYDROCHLORIDE 50 MG/ML
25 INJECTION INTRAMUSCULAR; INTRAVENOUS EVERY 6 HOURS PRN
Status: DISCONTINUED | OUTPATIENT
Start: 2021-09-22 | End: 2021-09-23 | Stop reason: HOSPADM

## 2021-09-22 RX ORDER — ACETAMINOPHEN 500 MG
1000 TABLET ORAL EVERY 6 HOURS PRN
Status: DISCONTINUED | OUTPATIENT
Start: 2021-09-24 | End: 2021-09-23 | Stop reason: HOSPADM

## 2021-09-22 RX ORDER — SODIUM CHLORIDE, SODIUM LACTATE, POTASSIUM CHLORIDE, CALCIUM CHLORIDE 600; 310; 30; 20 MG/100ML; MG/100ML; MG/100ML; MG/100ML
INJECTION, SOLUTION INTRAVENOUS CONTINUOUS
Status: ACTIVE | OUTPATIENT
Start: 2021-09-22 | End: 2021-09-22

## 2021-09-22 RX ORDER — ONDANSETRON 2 MG/ML
INJECTION INTRAMUSCULAR; INTRAVENOUS PRN
Status: DISCONTINUED | OUTPATIENT
Start: 2021-09-22 | End: 2021-09-22 | Stop reason: SURG

## 2021-09-22 RX ORDER — MAGNESIUM HYDROXIDE 1200 MG/15ML
LIQUID ORAL
Status: COMPLETED | OUTPATIENT
Start: 2021-09-22 | End: 2021-09-22

## 2021-09-22 RX ORDER — MIDAZOLAM HYDROCHLORIDE 1 MG/ML
INJECTION INTRAMUSCULAR; INTRAVENOUS PRN
Status: DISCONTINUED | OUTPATIENT
Start: 2021-09-22 | End: 2021-09-22 | Stop reason: SURG

## 2021-09-22 RX ORDER — CYCLOBENZAPRINE HCL 10 MG
10 TABLET ORAL EVERY 8 HOURS PRN
Status: DISCONTINUED | OUTPATIENT
Start: 2021-09-22 | End: 2021-09-23 | Stop reason: HOSPADM

## 2021-09-22 RX ORDER — HYDRALAZINE HYDROCHLORIDE 20 MG/ML
5 INJECTION INTRAMUSCULAR; INTRAVENOUS
Status: DISCONTINUED | OUTPATIENT
Start: 2021-09-22 | End: 2021-09-22 | Stop reason: HOSPADM

## 2021-09-22 RX ORDER — SODIUM CHLORIDE, SODIUM LACTATE, POTASSIUM CHLORIDE, CALCIUM CHLORIDE 600; 310; 30; 20 MG/100ML; MG/100ML; MG/100ML; MG/100ML
INJECTION, SOLUTION INTRAVENOUS CONTINUOUS
Status: DISCONTINUED | OUTPATIENT
Start: 2021-09-22 | End: 2021-09-23 | Stop reason: HOSPADM

## 2021-09-22 RX ORDER — HYDROMORPHONE HYDROCHLORIDE 1 MG/ML
0.1 INJECTION, SOLUTION INTRAMUSCULAR; INTRAVENOUS; SUBCUTANEOUS
Status: DISCONTINUED | OUTPATIENT
Start: 2021-09-22 | End: 2021-09-22 | Stop reason: HOSPADM

## 2021-09-22 RX ORDER — PROMETHAZINE HYDROCHLORIDE 25 MG/1
12.5-25 SUPPOSITORY RECTAL EVERY 4 HOURS PRN
Status: DISCONTINUED | OUTPATIENT
Start: 2021-09-22 | End: 2021-09-23 | Stop reason: HOSPADM

## 2021-09-22 RX ORDER — OMEPRAZOLE 20 MG/1
20 CAPSULE, DELAYED RELEASE ORAL EVERY MORNING
Status: DISCONTINUED | OUTPATIENT
Start: 2021-09-23 | End: 2021-09-23 | Stop reason: HOSPADM

## 2021-09-22 RX ORDER — PREGABALIN 150 MG/1
150-300 CAPSULE ORAL DAILY
Status: ON HOLD | COMMUNITY
Start: 2021-09-09 | End: 2021-09-23 | Stop reason: SDUPTHER

## 2021-09-22 RX ORDER — PROCHLORPERAZINE EDISYLATE 5 MG/ML
5-10 INJECTION INTRAMUSCULAR; INTRAVENOUS EVERY 4 HOURS PRN
Status: DISCONTINUED | OUTPATIENT
Start: 2021-09-22 | End: 2021-09-23 | Stop reason: HOSPADM

## 2021-09-22 RX ORDER — SODIUM CHLORIDE AND POTASSIUM CHLORIDE 150; 900 MG/100ML; MG/100ML
INJECTION, SOLUTION INTRAVENOUS CONTINUOUS
Status: DISCONTINUED | OUTPATIENT
Start: 2021-09-22 | End: 2021-09-23

## 2021-09-22 RX ORDER — CEFAZOLIN SODIUM 1 G/3ML
INJECTION, POWDER, FOR SOLUTION INTRAMUSCULAR; INTRAVENOUS PRN
Status: DISCONTINUED | OUTPATIENT
Start: 2021-09-22 | End: 2021-09-22 | Stop reason: SURG

## 2021-09-22 RX ORDER — DIPHENHYDRAMINE HYDROCHLORIDE 50 MG/ML
12.5 INJECTION INTRAMUSCULAR; INTRAVENOUS
Status: DISCONTINUED | OUTPATIENT
Start: 2021-09-22 | End: 2021-09-22 | Stop reason: HOSPADM

## 2021-09-22 RX ORDER — DOCUSATE SODIUM 100 MG/1
100 CAPSULE, LIQUID FILLED ORAL 2 TIMES DAILY
Status: DISCONTINUED | OUTPATIENT
Start: 2021-09-22 | End: 2021-09-23 | Stop reason: HOSPADM

## 2021-09-22 RX ORDER — POLYETHYLENE GLYCOL 3350 17 G/17G
1 POWDER, FOR SOLUTION ORAL 2 TIMES DAILY PRN
Status: DISCONTINUED | OUTPATIENT
Start: 2021-09-22 | End: 2021-09-23 | Stop reason: HOSPADM

## 2021-09-22 RX ORDER — MORPHINE SULFATE 4 MG/ML
2 INJECTION, SOLUTION INTRAMUSCULAR; INTRAVENOUS ONCE
Status: COMPLETED | OUTPATIENT
Start: 2021-09-22 | End: 2021-09-22

## 2021-09-22 RX ORDER — AMOXICILLIN 250 MG
1 CAPSULE ORAL
Status: DISCONTINUED | OUTPATIENT
Start: 2021-09-22 | End: 2021-09-23 | Stop reason: HOSPADM

## 2021-09-22 RX ORDER — CEFAZOLIN SODIUM 1 G/3ML
2 INJECTION, POWDER, FOR SOLUTION INTRAMUSCULAR; INTRAVENOUS ONCE
Status: DISCONTINUED | OUTPATIENT
Start: 2021-09-22 | End: 2021-09-22 | Stop reason: HOSPADM

## 2021-09-22 RX ORDER — ALBUTEROL SULFATE 90 UG/1
2 AEROSOL, METERED RESPIRATORY (INHALATION) EVERY 4 HOURS PRN
Status: DISCONTINUED | OUTPATIENT
Start: 2021-09-22 | End: 2021-09-23 | Stop reason: HOSPADM

## 2021-09-22 RX ORDER — SODIUM CHLORIDE, SODIUM LACTATE, POTASSIUM CHLORIDE, AND CALCIUM CHLORIDE .6; .31; .03; .02 G/100ML; G/100ML; G/100ML; G/100ML
IRRIGANT IRRIGATION
Status: DISCONTINUED | OUTPATIENT
Start: 2021-09-22 | End: 2021-09-22 | Stop reason: HOSPADM

## 2021-09-22 RX ORDER — HYDROMORPHONE HYDROCHLORIDE 1 MG/ML
0.4 INJECTION, SOLUTION INTRAMUSCULAR; INTRAVENOUS; SUBCUTANEOUS
Status: DISCONTINUED | OUTPATIENT
Start: 2021-09-22 | End: 2021-09-22 | Stop reason: HOSPADM

## 2021-09-22 RX ORDER — LOSARTAN POTASSIUM 25 MG/1
25 TABLET ORAL
Status: DISCONTINUED | OUTPATIENT
Start: 2021-09-22 | End: 2021-09-23 | Stop reason: HOSPADM

## 2021-09-22 RX ORDER — PREGABALIN 150 MG/1
150-300 CAPSULE ORAL DAILY
Status: DISCONTINUED | OUTPATIENT
Start: 2021-09-23 | End: 2021-09-23 | Stop reason: HOSPADM

## 2021-09-22 RX ORDER — LOSARTAN POTASSIUM 25 MG/1
25 TABLET ORAL NIGHTLY
Status: DISCONTINUED | OUTPATIENT
Start: 2021-09-22 | End: 2021-09-23 | Stop reason: HOSPADM

## 2021-09-22 RX ORDER — ONDANSETRON 2 MG/ML
4 INJECTION INTRAMUSCULAR; INTRAVENOUS
Status: DISCONTINUED | OUTPATIENT
Start: 2021-09-22 | End: 2021-09-22 | Stop reason: HOSPADM

## 2021-09-22 RX ORDER — REMIFENTANIL HYDROCHLORIDE 1 MG/ML
INJECTION, POWDER, LYOPHILIZED, FOR SOLUTION INTRAVENOUS
Status: DISCONTINUED | OUTPATIENT
Start: 2021-09-22 | End: 2021-09-22 | Stop reason: SURG

## 2021-09-22 RX ORDER — ONDANSETRON 4 MG/1
4 TABLET, ORALLY DISINTEGRATING ORAL EVERY 4 HOURS PRN
Status: DISCONTINUED | OUTPATIENT
Start: 2021-09-22 | End: 2021-09-23 | Stop reason: HOSPADM

## 2021-09-22 RX ORDER — BISACODYL 10 MG
10 SUPPOSITORY, RECTAL RECTAL
Status: DISCONTINUED | OUTPATIENT
Start: 2021-09-22 | End: 2021-09-23 | Stop reason: HOSPADM

## 2021-09-22 RX ORDER — MORPHINE SULFATE 4 MG/ML
2 INJECTION, SOLUTION INTRAMUSCULAR; INTRAVENOUS
Status: DISCONTINUED | OUTPATIENT
Start: 2021-09-22 | End: 2021-09-23 | Stop reason: HOSPADM

## 2021-09-22 RX ORDER — CEFAZOLIN SODIUM 2 G/100ML
2 INJECTION, SOLUTION INTRAVENOUS EVERY 8 HOURS
Status: COMPLETED | OUTPATIENT
Start: 2021-09-22 | End: 2021-09-23

## 2021-09-22 RX ORDER — LORAZEPAM 2 MG/ML
0.5 INJECTION INTRAMUSCULAR
Status: DISCONTINUED | OUTPATIENT
Start: 2021-09-22 | End: 2021-09-22 | Stop reason: HOSPADM

## 2021-09-22 RX ORDER — OXYCODONE HCL 10 MG/1
10 TABLET, FILM COATED, EXTENDED RELEASE ORAL ONCE
Status: COMPLETED | OUTPATIENT
Start: 2021-09-22 | End: 2021-09-22

## 2021-09-22 RX ADMIN — CEFAZOLIN SODIUM 2 G: 2 INJECTION, SOLUTION INTRAVENOUS at 18:00

## 2021-09-22 RX ADMIN — FENTANYL CITRATE 25 MCG: 0.05 INJECTION, SOLUTION INTRAMUSCULAR; INTRAVENOUS at 14:33

## 2021-09-22 RX ADMIN — FENTANYL CITRATE 25 MCG: 0.05 INJECTION, SOLUTION INTRAMUSCULAR; INTRAVENOUS at 13:59

## 2021-09-22 RX ADMIN — OXYCODONE HYDROCHLORIDE 5 MG: 5 SOLUTION ORAL at 13:53

## 2021-09-22 RX ADMIN — DEXAMETHASONE SODIUM PHOSPHATE 10 MG: 4 INJECTION, SOLUTION INTRA-ARTICULAR; INTRALESIONAL; INTRAMUSCULAR; INTRAVENOUS; SOFT TISSUE at 11:48

## 2021-09-22 RX ADMIN — METOCLOPRAMIDE 10 MG: 5 INJECTION, SOLUTION INTRAMUSCULAR; INTRAVENOUS at 11:48

## 2021-09-22 RX ADMIN — REMIFENTANIL HYDROCHLORIDE 0.1 MCG/KG/MIN: 1 INJECTION, POWDER, LYOPHILIZED, FOR SOLUTION INTRAVENOUS at 11:39

## 2021-09-22 RX ADMIN — SODIUM CHLORIDE, POTASSIUM CHLORIDE, SODIUM LACTATE AND CALCIUM CHLORIDE: 600; 310; 30; 20 INJECTION, SOLUTION INTRAVENOUS at 11:28

## 2021-09-22 RX ADMIN — LOSARTAN POTASSIUM 25 MG: 25 TABLET, FILM COATED ORAL at 20:41

## 2021-09-22 RX ADMIN — ROCURONIUM BROMIDE 10 MG: 10 INJECTION, SOLUTION INTRAVENOUS at 11:33

## 2021-09-22 RX ADMIN — MORPHINE SULFATE 2 MG: 4 INJECTION INTRAVENOUS at 17:59

## 2021-09-22 RX ADMIN — SUCCINYLCHOLINE CHLORIDE 120 MG: 20 INJECTION, SOLUTION INTRAMUSCULAR; INTRAVENOUS; PARENTERAL at 11:33

## 2021-09-22 RX ADMIN — DEXAMETHASONE SODIUM PHOSPHATE 4 MG: 4 INJECTION, SOLUTION INTRA-ARTICULAR; INTRALESIONAL; INTRAMUSCULAR; INTRAVENOUS; SOFT TISSUE at 17:59

## 2021-09-22 RX ADMIN — DOCUSATE SODIUM 100 MG: 100 CAPSULE ORAL at 17:59

## 2021-09-22 RX ADMIN — CEFAZOLIN 2 G: 330 INJECTION, POWDER, FOR SOLUTION INTRAMUSCULAR; INTRAVENOUS at 11:33

## 2021-09-22 RX ADMIN — ACETAMINOPHEN 1000 MG: 500 TABLET ORAL at 17:59

## 2021-09-22 RX ADMIN — MORPHINE SULFATE: 50 INJECTION, SOLUTION, CONCENTRATE INTRAVENOUS at 20:42

## 2021-09-22 RX ADMIN — FENTANYL CITRATE 25 MCG: 0.05 INJECTION, SOLUTION INTRAMUSCULAR; INTRAVENOUS at 14:08

## 2021-09-22 RX ADMIN — SODIUM CHLORIDE, POTASSIUM CHLORIDE, SODIUM LACTATE AND CALCIUM CHLORIDE: 600; 310; 30; 20 INJECTION, SOLUTION INTRAVENOUS at 07:24

## 2021-09-22 RX ADMIN — HYDROMORPHONE HYDROCHLORIDE 1 MG: 2 INJECTION, SOLUTION INTRAMUSCULAR; INTRAVENOUS; SUBCUTANEOUS at 12:56

## 2021-09-22 RX ADMIN — SODIUM CHLORIDE, POTASSIUM CHLORIDE, SODIUM LACTATE AND CALCIUM CHLORIDE: 600; 310; 30; 20 INJECTION, SOLUTION INTRAVENOUS at 12:52

## 2021-09-22 RX ADMIN — SENNOSIDES AND DOCUSATE SODIUM 1 TABLET: 50; 8.6 TABLET ORAL at 21:00

## 2021-09-22 RX ADMIN — FENTANYL CITRATE 25 MCG: 0.05 INJECTION, SOLUTION INTRAMUSCULAR; INTRAVENOUS at 13:56

## 2021-09-22 RX ADMIN — ACETAMINOPHEN 1000 MG: 500 TABLET ORAL at 10:26

## 2021-09-22 RX ADMIN — OXYCODONE HYDROCHLORIDE 10 MG: 10 TABLET, FILM COATED, EXTENDED RELEASE ORAL at 10:26

## 2021-09-22 RX ADMIN — FENTANYL CITRATE 100 MCG: 50 INJECTION, SOLUTION INTRAMUSCULAR; INTRAVENOUS at 11:33

## 2021-09-22 RX ADMIN — POTASSIUM CHLORIDE AND SODIUM CHLORIDE: 900; 150 INJECTION, SOLUTION INTRAVENOUS at 18:01

## 2021-09-22 RX ADMIN — DOCUSATE SODIUM 50 MG AND SENNOSIDES 8.6 MG 1 TABLET: 8.6; 5 TABLET, FILM COATED ORAL at 20:41

## 2021-09-22 RX ADMIN — ONDANSETRON 4 MG: 2 INJECTION INTRAMUSCULAR; INTRAVENOUS at 11:48

## 2021-09-22 RX ADMIN — MIDAZOLAM HYDROCHLORIDE 2 MG: 1 INJECTION, SOLUTION INTRAMUSCULAR; INTRAVENOUS at 11:28

## 2021-09-22 RX ADMIN — GABAPENTIN 100 MG: 100 CAPSULE ORAL at 10:29

## 2021-09-22 RX ADMIN — FAMOTIDINE 20 MG: 10 INJECTION INTRAVENOUS at 12:38

## 2021-09-22 RX ADMIN — PROPOFOL 150 MG: 10 INJECTION, EMULSION INTRAVENOUS at 11:33

## 2021-09-22 ASSESSMENT — PAIN DESCRIPTION - PAIN TYPE
TYPE: SURGICAL PAIN
TYPE: CHRONIC PAIN
TYPE: SURGICAL PAIN
TYPE: SURGICAL PAIN

## 2021-09-22 ASSESSMENT — PAIN SCALES - GENERAL: PAIN_LEVEL: 6

## 2021-09-22 NOTE — ANESTHESIA PREPROCEDURE EVALUATION
Relevant Problems   CARDIAC   (positive) Hypertension      Other   (positive) Alcohol abuse, episodic   (positive) Chronic pain syndrome   (positive) DDD (degenerative disc disease), cervical   (positive) Radiculitis, cervical       Physical Exam    Airway   Mallampati: II  TM distance: >3 FB  Neck ROM: full       Cardiovascular - normal exam  Rhythm: regular  Rate: normal  (-) murmur     Dental - normal exam           Pulmonary - normal exam  Breath sounds clear to auscultation     Abdominal    Neurological - normal exam                 Anesthesia Plan    ASA 2       Plan - general       Airway plan will be ETT        Plan Factors:   Patient was not previously instructed to abstain from smoking on day of procedure.  Patient did not smoke on day of procedure.      Induction: intravenous    Postoperative Plan: Postoperative administration of opioids is intended.    Pertinent diagnostic labs and testing reviewed    Informed Consent:    Anesthetic plan and risks discussed with patient.    Use of blood products discussed with: patient whom consented to blood products.

## 2021-09-22 NOTE — ANESTHESIA TIME REPORT
Anesthesia Start and Stop Event Times     Date Time Event    9/22/2021 11:13 AM Ready for Procedure    9/22/2021 11:28 AM Anesthesia Start    9/22/2021 01:34 PM Anesthesia Stop        Responsible Staff  09/22/21    Name Role Begin End    Sadia Mccain M.D. Anesthesiologist 09/22/21 11:28 AM 09/22/21 01:34 PM        Preop Diagnosis (Free Text):  Pre-op Diagnosis     Cervical radiculopathy [M54.12]        Preop Diagnosis (Codes):  Diagnosis Information     Diagnosis Code(s): Cervical radiculopathy [M54.12]        Post op Diagnosis  Cervical radiculopathy      Premium Reason  Non-Premium    Comments: ACDF C5-C6/C6-C7

## 2021-09-22 NOTE — PROGRESS NOTES
Patient reviewed in recovery.  Full strength in his arms and legs.  Right arm is normal.  His wound is flat.

## 2021-09-22 NOTE — ANESTHESIA POSTPROCEDURE EVALUATION
Patient: Viktor Mesa    Procedure Summary     Date: 09/22/21 Room / Location: [place] [id number] / SURGERY [facility]    Anesthesia Start: 1128 Anesthesia Stop: 1334    Procedure: DISCECTOMY, SPINE, CERVICAL, ANTERIOR APPROACH, WITH FUSION - C5-6, C6-7 DECOMPRESSION AND PLACEMENT OF A PRESTIGE ARTIFICAL DISC PROSTHESIS (Spine Cervical) Diagnosis:       Cervical radiculopathy      (Cervical radiculopathy [M54.12])    Surgeons: Kay Rolle M.D. Responsible Provider: Sadia Mccain M.D.    Anesthesia Type: general ASA Status: 2          Final Anesthesia Type: general  Last vitals  BP   Blood Pressure: 139/81    Temp   36.8 °C (98.2 °F)    Pulse   74   Resp   20    SpO2   94 %      Anesthesia Post Evaluation    Patient location during evaluation: PACU  Patient participation: complete - patient participated  Level of consciousness: awake and alert  Pain score: 6    Airway patency: patent  Anesthetic complications: no  Cardiovascular status: hemodynamically stable  Respiratory status: acceptable  Hydration status: euvolemic    PONV: none          No complications documented.     Nurse Pain Score: 6 (NPRS)

## 2021-09-22 NOTE — ANESTHESIA PROCEDURE NOTES
Arterial Line  Performed by: Sadia Mccain M.D.  Authorized by: Sadia Mccain M.D.     Start Time:  9/22/2021 11:38 AM  End Time:  9/22/2021 11:41 AM  Localization: surface landmarks    Patient Location:  OR  Indication: continuous blood pressure monitoring        Catheter Size:  20 G  Seldinger Technique?: Yes    Site:  Radial artery  Line Secured:  Antimicrobial disc, tape and transparent dressing  Events: patient tolerated procedure well with no complications

## 2021-09-22 NOTE — ANESTHESIA PROCEDURE NOTES
Airway    Date/Time: 9/22/2021 11:34 AM  Performed by: Sadia Mccain M.D.  Authorized by: Sadia Mccain M.D.     Location:  OR  Urgency:  Elective  Difficult Airway: No    Indications for Airway Management:  Anesthesia      Spontaneous Ventilation: absent    Sedation Level:  Deep  Preoxygenated: Yes    Patient Position:  Sniffing  MILS Maintained Throughout: No    Mask Difficulty Assessment:  2 - vent by mask + OA or adjuvant +/- NMBA  Final Airway Type:  Endotracheal airway  Final Endotracheal Airway:  ETT  Cuffed: Yes    Technique Used for Successful ETT Placement:  Video laryngoscopy  Devices/Methods Used in Placement:  Anterior pressure/BURP    Insertion Site:  Oral  Blade Type:  Glide  Laryngoscope Blade/Videolaryngoscope Blade Size:  4  ETT Size (mm):  7.5  Measured from:  Lips  ETT to Lips (cm):  23  Placement Verified by: auscultation and capnometry    Cormack-Lehane Classification:  Grade I - full view of glottis  Number of Attempts at Approach:  1  Number of Other Approaches Attempted:  0

## 2021-09-22 NOTE — OR SURGEON
Immediate Post OP Note    PreOp Diagnosis: cervical stenosis      PostOp Diagnosis: cervical stenosis    Procedure(s):  DISCECTOMY, SPINE, CERVICAL, ANTERIOR APPROACH, WITH FUSION - C5-6, C6-7 DECOMPRESSION AND PLACEMENT OF A PRESTIGE ARTIFICAL DISC PROSTHESIS - Wound Class: Clean with Drain    Surgeon(s):  Kay Rolle M.D.    Anesthesiologist/Type of Anesthesia:  Anesthesiologist: Sadia Mccain M.D./General    Surgical Staff:  Circulator: Lukas D. Gansert, R.N.  Relief Circulator: Lavonne Elizabeth R.N.  Scrub Person: Keren Bowman Assist: Brenton Polanco P.A.-C.  Radiology Technologist: Omega Pickens; Pratibha Liu    Specimens removed if any:  * No specimens in log *    Assistants: Brenton Polanco PA-C  ,  Specimen: nil    Estimated Blood Loss: 25 cc    Findings: n/a    Complications: nil        9/22/2021 1:23 PM Kay Rolle M.D.

## 2021-09-22 NOTE — PROGRESS NOTES
No change to my last H and P (that is labelled a progress note). The note was made by either myself, or my PAs Ronaldo Machuca or Brenton Polanco but is signed by me.If it was done by my physician assistant, I verify it is correct and has my co-signature.    Kay Rolle MD PhD YANA

## 2021-09-22 NOTE — OR NURSING
Patient in PACU in stable condition. VSS. Surgical dressing clean dry intact with hemovac to compression. Soft collar applied. Will continue to monitor and medicate appropriately.

## 2021-09-22 NOTE — OR NURSING
Patient to floor in stable condition. VSS. Surgical dressing clean dry intact with hemovac to compression. Aox4 and on 3 L o2 attached to full tank. Neurologically intact. No belongings. No further needs.

## 2021-09-22 NOTE — OP REPORT
1. DATE OF SURGERY:9/22/2021    2. SURGEON:  Kay Rolle MD, PhD, YANA, Neurosurgeon    3. ASSISTANT:  Brenton Polanco PA-C    An assistant was crucial to have during the case as the assistant helped with positioning, keeping the field clear of blood and retraction. This case could not be done easily without a qualified assistant. It was medically necessary    4. TYPE OF ANESTHESIA:  General anesthesia with endotracheal intubation    5. PREOPERATIVE DIAGNOSIS:      1.         Right rotator cuff [id number]/2020.  2.         Multilevel cervical spondylosis.  3.         Moderate central and bilateral foraminal stenosis, C5-6 and C6-7.  4.         Mild C3-4 stenosis.  5.   Failed conservative therapy      6. POSTOPERATIVE DIAGNOSIS: As above    7. HISTORY: See formal admission H and P    11/13/20:  This is a very nice 45-year-old male who presents for right arm pain.  He is accompanied by his wife.  He is involved in construction.  He reports that he has had neck issues for many years.  Over the years, he has had various injuries and has had flare-ups of neck pain.  He typically uses chiropractic for this.  His pain became worse.  In the past he has been on narcotic and has, unfortunately, gone back to it.  He said that on the 4th of July he threw a chair.  He may have injured his right shoulder at the time, but since then he has had shoulder problems and right arm pain.  His neck pain is an 8.  He has right shoulder pain.  He gets pain going down the right arm in the C7 distribution.  It is mainly numb on the right C7 distribution with a little bit on the left.  He is due to get his shoulder operated on 12/28/2020 by Dr. Fam.  In essence, he has neck pain, right arm pain, and shoulder pain.     Epidurals have been requested but denied.  He is on narcotic from Dr. Santamaria.  He is taking pregabalin without any major effect at the moment.  It looks like he is only on 50-mg dosages.  He is not making progress.      He does  smoke.  It is chewing tobacco and cigarettes.  It is only a few a day.  He has hypertension and sleep apnea.  This is a chronic problem.  He is  and was accompanied by his partner.  He has no known allergies.  He chews tobacco twice a day.  He does smoke 4 cigarettes a day as well.     8/18/2021  Patient returns.  He did extensive physical therapy.  An epidural was given 30 days of relief.  Had a shoulder surgery.  He is still in pain management.  He still getting right-sided neck pain with pain down the right arm with numbness and tingling.  It is a 9 out of 10.  He is failed all conservative measures.  I think it is reasonable to offer surgery.      8. PREOPERATIVE PHYSICAL EXAMINATION: See formal admission H and P    He is in distress.  He has a reduced range of motion of his cervical spine.  He has 4/5 weakness in right  strength, finger extensors, wrist extensors, and triceps.  He could only abduct his right shoulder to about 40 degrees.         9. TITLE OF THE PROCEDURE:    • C5-6 and C6-7 anterior cervical discectomy and placement of Prestige artificial disk prosthesis.  • Microscopic microdissection.  • Fluoroscopic guidance for placement of the device.    10. OPERATIVE FINDINGS:  Stenosis as outlined above.  C5-6 with the worst on the right side where C6-7 was buried centrally.    11. OPERATED LEVELS: C5-6 and C6-7    13. IMPLANTS USED:  Medtronic Prestige artificial disc prosthesis    14. COMPLICATIONS:  Nil.    15. ESTIMATED BLOOD LOSS:      25  mL    16. OPERATIVE DETAILS:    After a fully informed consent, the patient was brought to the operating room.  General anesthesia was administered. The case was done with AP and lateral fluoroscopy and neurophysiological monitoring, which was stable throughout.  The patient was given intravenous antibiotic and intravenous dexamethasone.  The patient was positioned on a regular operating table.  The head was placed in gentle extension.  A shoulder roll  was in place.  The head was resting on a donut headrest as well.  The shoulders were gently taped down and wrist restraints were used as well a footboard.  All pressure points were padded.     AP and lateral x-ray confirmed the affected level was visualized and centered.    The left side of the neck was prepped and draped in a standard fashion.  Local anesthetic was placed into the wound prior to the skin incision.   After fluoroscopic localization, a transverse incision was effected as localized by the x-ray.  Dissection continued down to the platysma.  The plain above this was extensively undermined. The plain above the platysma was extensively undermined.  The platysma was then split in a longitudinal fashion.  Dissection then continued medial to the carotid sheath, lateral to the pharynx, through the prevertebral fascia, in an extensile fashion, to expose the anterior vertebral bodies.  The prevertebral fascia was divided with monopolar cautery.  The omohyoid muscle, if in the way, was divided with monopolar cautery.  I then placed a spinal needle into the affected disk space and this was confirmed on lateral fluoroscopy.  The longus colli muscles were then undermined on either side of the operated levels. The Shadow-Line self-retaining retractors were then placed medially and laterally as well as cranially and caudally.  An appropriate number of 14 mm Logan distraction pins were then placed under fluoroscopic guidance into the midpoint of the vertebral parallel to the endplates.  The Logan distractor was used to achieve some measure of distraction at 1-2 clicks.The disk space was incised and disk material was removed with a curette.     The operating microscope was then bought into the field. Using AM-12 and then an AM-8 Midas Thanh drill, the posterior lip of osteophyte was drilled down and endplates flattened.     Using a 5-0 angle curette, the PLL was split and the remaining disk, osteophyte and ligament at  each affected level was removed with 1 and 2 mm Kerrison punches.  A good central and bilateral foraminal decompression at each level was affected.  Hemostasis was obtained.  This was done sequentially at each level.  I initially started at C5-6 and then went down to C6-7.    Once all the decompressions were done, I then turned to place the one of the device. devices.  Using trials the appropriate size was selected. The  was used to cut the rail guides. The appropriately sized Prestige artificial disc prosthesis was placed.    Initially I trialed to place 6 mm implants in both levels.  Both of them were 16 mm.  Initially I placed a device in the midline using fluoroscopic guidance at C6-7.  I then turned the C5-6 and to the same.  This was also a 6 x 16 mm.      Final AP and lateral x-rays were taken.  Neurophysiologic monitoring was stable.  The pharynx was inspected to ensure there was no injury.      Closure was then affected in a standard fashion using 3-0 Vicryl sutures over a suction subfascial Hemovac. Dermabond was applied to the skin and a dressing and soft collar applied prior to transfer to recovery.     All counts were correct and all instruments accounted for.    16 PROGNOSIS:  The surgery went well.  The patient has been decompressed.  At the end of the case, the patient awoke moving his/her upper and lower extremities well.    The plan will be to observe the patient very carefully for the next 24 hours in case there is any bleeding and I would anticipate the patient will be discharged tomorrow morning.  When the patient goes home, he/she will be discharged home on narcotic analgesia, muscle relaxant, meloxicam and oral antibiotic.  The plan will be to follow up in 2 weeks in the office.  We will call the patient next week to ensure there are not any problems.  He/she can shower in 72 hours but is instructed to keep the wound dry. Soft collar is for comfort.    Kay Rolle MD, PhD, YANA

## 2021-09-23 VITALS
DIASTOLIC BLOOD PRESSURE: 83 MMHG | WEIGHT: 203.93 LBS | OXYGEN SATURATION: 98 % | HEART RATE: 75 BPM | SYSTOLIC BLOOD PRESSURE: 139 MMHG | BODY MASS INDEX: 30.2 KG/M2 | RESPIRATION RATE: 17 BRPM | TEMPERATURE: 97.4 F | HEIGHT: 69 IN

## 2021-09-23 LAB
ANION GAP SERPL CALC-SCNC: 12 MMOL/L (ref 7–16)
APTT PPP: 25 SEC (ref 24.7–36)
BUN SERPL-MCNC: 11 MG/DL (ref 8–22)
CALCIUM SERPL-MCNC: 9.2 MG/DL (ref 8.5–10.5)
CHLORIDE SERPL-SCNC: 99 MMOL/L (ref 96–112)
CO2 SERPL-SCNC: 23 MMOL/L (ref 20–33)
CREAT SERPL-MCNC: 0.62 MG/DL (ref 0.5–1.4)
ERYTHROCYTE [DISTWIDTH] IN BLOOD BY AUTOMATED COUNT: 43.8 FL (ref 35.9–50)
GLUCOSE SERPL-MCNC: 133 MG/DL (ref 65–99)
HCT VFR BLD AUTO: 46.6 % (ref 42–52)
HGB BLD-MCNC: 15.6 G/DL (ref 14–18)
INR PPP: 0.94 (ref 0.87–1.13)
MCH RBC QN AUTO: 31.2 PG (ref 27–33)
MCHC RBC AUTO-ENTMCNC: 33.5 G/DL (ref 33.7–35.3)
MCV RBC AUTO: 93.2 FL (ref 81.4–97.8)
PLATELET # BLD AUTO: 305 K/UL (ref 164–446)
PMV BLD AUTO: 10.8 FL (ref 9–12.9)
POTASSIUM SERPL-SCNC: 4.4 MMOL/L (ref 3.6–5.5)
PROTHROMBIN TIME: 12.3 SEC (ref 12–14.6)
RBC # BLD AUTO: 5 M/UL (ref 4.7–6.1)
SODIUM SERPL-SCNC: 134 MMOL/L (ref 135–145)
WBC # BLD AUTO: 18.3 K/UL (ref 4.8–10.8)

## 2021-09-23 PROCEDURE — 36415 COLL VENOUS BLD VENIPUNCTURE: CPT

## 2021-09-23 PROCEDURE — G0378 HOSPITAL OBSERVATION PER HR: HCPCS

## 2021-09-23 PROCEDURE — 700111 HCHG RX REV CODE 636 W/ 250 OVERRIDE (IP): Performed by: PHYSICIAN ASSISTANT

## 2021-09-23 PROCEDURE — 99024 POSTOP FOLLOW-UP VISIT: CPT | Performed by: PHYSICIAN ASSISTANT

## 2021-09-23 PROCEDURE — 96376 TX/PRO/DX INJ SAME DRUG ADON: CPT

## 2021-09-23 PROCEDURE — 85027 COMPLETE CBC AUTOMATED: CPT

## 2021-09-23 PROCEDURE — 97535 SELF CARE MNGMENT TRAINING: CPT

## 2021-09-23 PROCEDURE — 97165 OT EVAL LOW COMPLEX 30 MIN: CPT

## 2021-09-23 PROCEDURE — 85610 PROTHROMBIN TIME: CPT

## 2021-09-23 PROCEDURE — 85730 THROMBOPLASTIN TIME PARTIAL: CPT

## 2021-09-23 PROCEDURE — 80048 BASIC METABOLIC PNL TOTAL CA: CPT

## 2021-09-23 PROCEDURE — 700102 HCHG RX REV CODE 250 W/ 637 OVERRIDE(OP): Performed by: PHYSICIAN ASSISTANT

## 2021-09-23 PROCEDURE — A9270 NON-COVERED ITEM OR SERVICE: HCPCS | Performed by: PHYSICIAN ASSISTANT

## 2021-09-23 RX ORDER — OXYCODONE HYDROCHLORIDE 10 MG/1
10 TABLET ORAL EVERY 6 HOURS PRN
Status: DISCONTINUED | OUTPATIENT
Start: 2021-09-23 | End: 2021-09-23 | Stop reason: HOSPADM

## 2021-09-23 RX ORDER — CEPHALEXIN 500 MG/1
500 CAPSULE ORAL 4 TIMES DAILY
Status: DISCONTINUED | OUTPATIENT
Start: 2021-09-23 | End: 2021-09-23 | Stop reason: HOSPADM

## 2021-09-23 RX ORDER — CELECOXIB 200 MG/1
200 CAPSULE ORAL 2 TIMES DAILY
Status: DISCONTINUED | OUTPATIENT
Start: 2021-09-23 | End: 2021-09-23 | Stop reason: HOSPADM

## 2021-09-23 RX ORDER — CYCLOBENZAPRINE HCL 10 MG
10 TABLET ORAL EVERY 8 HOURS PRN
Qty: 90 TABLET | Refills: 0 | Status: SHIPPED | OUTPATIENT
Start: 2021-09-23 | End: 2021-10-23

## 2021-09-23 RX ORDER — PREGABALIN 150 MG/1
150 CAPSULE ORAL DAILY
Qty: 60 CAPSULE | Refills: 0 | Status: SHIPPED | OUTPATIENT
Start: 2021-09-23 | End: 2021-10-23

## 2021-09-23 RX ORDER — OXYCODONE HYDROCHLORIDE 10 MG/1
10 TABLET ORAL EVERY 6 HOURS PRN
Qty: 28 TABLET | Refills: 0 | Status: SHIPPED | OUTPATIENT
Start: 2021-09-23 | End: 2021-09-30

## 2021-09-23 RX ORDER — ACETAMINOPHEN 500 MG
1000 TABLET ORAL EVERY 6 HOURS
Qty: 30 TABLET | Refills: 0 | Status: SHIPPED | OUTPATIENT
Start: 2021-09-23 | End: 2021-10-07

## 2021-09-23 RX ORDER — CELECOXIB 200 MG/1
200 CAPSULE ORAL 2 TIMES DAILY
Qty: 60 CAPSULE | Refills: 2 | Status: SHIPPED | OUTPATIENT
Start: 2021-09-23 | End: 2021-10-23

## 2021-09-23 RX ORDER — CEPHALEXIN 500 MG/1
500 CAPSULE ORAL 4 TIMES DAILY
Qty: 20 CAPSULE | Refills: 0 | Status: SHIPPED | OUTPATIENT
Start: 2021-09-23 | End: 2021-09-28

## 2021-09-23 RX ADMIN — DOCUSATE SODIUM 100 MG: 100 CAPSULE ORAL at 04:17

## 2021-09-23 RX ADMIN — CEPHALEXIN 500 MG: 500 CAPSULE ORAL at 09:17

## 2021-09-23 RX ADMIN — PREGABALIN 150 MG: 150 CAPSULE ORAL at 06:39

## 2021-09-23 RX ADMIN — CELECOXIB 200 MG: 200 CAPSULE ORAL at 09:17

## 2021-09-23 RX ADMIN — ACETAMINOPHEN 1000 MG: 500 TABLET ORAL at 04:17

## 2021-09-23 RX ADMIN — OMEPRAZOLE 20 MG: 20 CAPSULE, DELAYED RELEASE ORAL at 06:39

## 2021-09-23 RX ADMIN — ACETAMINOPHEN 1000 MG: 500 TABLET ORAL at 00:13

## 2021-09-23 RX ADMIN — DEXAMETHASONE SODIUM PHOSPHATE 4 MG: 4 INJECTION, SOLUTION INTRA-ARTICULAR; INTRALESIONAL; INTRAMUSCULAR; INTRAVENOUS; SOFT TISSUE at 00:13

## 2021-09-23 RX ADMIN — OXYCODONE HYDROCHLORIDE 10 MG: 10 TABLET ORAL at 10:01

## 2021-09-23 RX ADMIN — MAGNESIUM HYDROXIDE 30 ML: 400 SUSPENSION ORAL at 07:52

## 2021-09-23 RX ADMIN — CEFAZOLIN SODIUM 2 G: 2 INJECTION, SOLUTION INTRAVENOUS at 02:25

## 2021-09-23 RX ADMIN — DEXAMETHASONE SODIUM PHOSPHATE 4 MG: 4 INJECTION, SOLUTION INTRA-ARTICULAR; INTRALESIONAL; INTRAMUSCULAR; INTRAVENOUS; SOFT TISSUE at 04:17

## 2021-09-23 ASSESSMENT — COGNITIVE AND FUNCTIONAL STATUS - GENERAL
SUGGESTED CMS G CODE MODIFIER DAILY ACTIVITY: CJ
DAILY ACTIVITIY SCORE: 22
HELP NEEDED FOR BATHING: A LITTLE
DRESSING REGULAR LOWER BODY CLOTHING: A LITTLE

## 2021-09-23 ASSESSMENT — ACTIVITIES OF DAILY LIVING (ADL): TOILETING: INDEPENDENT

## 2021-09-23 ASSESSMENT — PAIN DESCRIPTION - PAIN TYPE: TYPE: SURGICAL PAIN

## 2021-09-23 NOTE — PROGRESS NOTES
"Neurosurgery  POD# 1  Seen with Dr. Rolle  Ambulating  Voiding  Tolerating oral medications  Denies nausea, vomiting  Pain controlled on current medication regimen  Arm symptoms improved, slight numbness persists in the right fingers    Objective:  /83   Pulse 75   Temp 36.3 °C (97.4 °F) (Temporal)   Resp 17   Ht 1.753 m (5' 9\")   Wt 92.5 kg (203 lb 14.8 oz)   SpO2 98%     Intake/Output Summary (Last 24 hours) at 9/23/2021 0758  Last data filed at 9/23/2021 0700  Gross per 24 hour   Intake 1100 ml   Output 1770 ml   Net -670 ml       Recent Labs     09/23/21  0132   WBC 18.3*   RBC 5.00   HEMOGLOBIN 15.6   HEMATOCRIT 46.6   MCV 93.2   MCH 31.2   MCHC 33.5*   RDW 43.8   PLATELETCT 305   MPV 10.8     Recent Labs     09/22/21  2121 09/23/21  0132   SODIUM 137 134*   POTASSIUM 4.4 4.4   CHLORIDE 102 99   CO2 18* 23   GLUCOSE 190* 133*   BUN 9 11     Recent Labs     09/23/21  0132   APTT 25.0   INR 0.94     VSS  LS  Hemovac 25cc/8rh am  Surgical incision clean, dry, intact, no evidence of infection  Strength:  Upper extremities are 5/5 grossly  Otherwise neurologically intact    Assessment:  Active Hospital Problems    Diagnosis    • Hypertension [I10]    • Radiculitis, cervical [M54.12]    • DDD (degenerative disc disease), cervical [M50.30]    • Chronic pain syndrome [G89.4]    • Alcohol abuse, episodic [F10.10]      POD#1 S/p C5-7 anterior cervical decompression and placement of artificial disk    Plan:  1. Ambulate with PT/OT as tolerated - soft collar for comfort  2. Advance diet as tolerated  3. D/c PCA, Advance orals, d/c IV fluids  4. D/c hemovac at 1000, home thereafter, scripts sent to pharmacy, call with any issues  "

## 2021-09-23 NOTE — THERAPY
"Occupational Therapy   Initial Evaluation     Patient Name: Viktor Mesa  Age:  46 y.o., Sex:  male  Medical Record #: [id number]  Today's Date: 9/23/2021     Precautions  Precautions: (P) Spinal / Back Precautions   Comments: (P) soft collar PRN    Assessment  Patient is 46 y.o. male seen s/p C5-C7 ACDF for OT eval. Pt demonstrated poor adherence to neutral spinal position and required multiple verbal cues throughout session. Pt's wife was present and receptive to education. Provided extensive education and training on ADLs and IADLs at home in the context of new spinal precautions, donning/doffing soft collar, and pacing of activities at home (provided handout with personalized recommendations). Pt verbalized understanding but demonstrated poor carry over. As pt demonstrated supervision level for all ADLs (despite max cues), I anticipate he is at baseline. Recommend home with wife assist. No further OT needs are indicated at this time.     Plan    Recommend Occupational Therapy for Evaluation only     DC Equipment Recommendations: (P) None  Discharge Recommendations: (P) Anticipate that the patient will have no further occupational therapy needs after discharge from the hospital     Subjective      \"when can I have a beer\"      09/23/21 0935   Initial Contact Note    Initial Contact Note Order Received and Verified, Evaluation Only - Patient Does Not Require Further Acute Occupational Therapy at this Time.  However, May Benefit from Post Acute Therapy for Higher Level Functional Deficits.   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)   Prior Level of ADL Function   Self Feeding Independent   Grooming / " Hygiene Independent   Bathing Independent   Dressing Independent   Toileting Independent   Prior Level of IADL Function   Medication Management Independent   Laundry Independent   Kitchen Mobility Independent   Finances Independent   Home Management Independent   Shopping Independent   Prior Level Of Mobility Independent Without Device in Community;Independent Without Device in Home   Driving / Transportation Driving Independent   History of Falls   History of Falls No   Precautions   Precautions Spinal / Back Precautions    Comments soft collar PRN   Pain   Pain Scales 0 to 10 Scale    Pain 0 - 10 Group   Therapist Pain Assessment   (agreeable to session)   Cognition    Cognition / Consciousness X   Level of Consciousness Alert   Safety Awareness Impaired   New Learning Impaired   Attention Impaired   Comments Pt not very receptive to education and required multiple repetitions throughout for adherence to spinal precautions. Pt's spouse very receptive and encouraging   Active ROM Upper Body   Active ROM Upper Body  WDL   Strength Upper Body   Upper Body Strength  X   Comments Hx of RUE rotator cuff repair   Sensation Upper Body   Upper Extremity Sensation  X   Comments limited RUE sensation down to hand   Balance Assessment   Sitting Balance (Static) Fair +   Sitting Balance (Dynamic) Fair +   Standing Balance (Static) Fair   Standing Balance (Dynamic) Fair   Weight Shift Sitting Fair   Weight Shift Standing Fair   Comments c/o AD   Bed Mobility    Supine to Sit Supervised   Sit to Supine Supervised   Scooting Supervised   Comments max cues for log roll   ADL Assessment   Eating Supervision   Upper Body Dressing Supervision  (don soft collar)   Lower Body Dressing Supervision  (underwear, pants, socks)   Toileting Supervision   Comments max cues for spinal precautions, unable to consistently adhere to precautions   How much help from another person does the patient currently need...   Putting on and taking off  regular lower body clothing? 3   Bathing (including washing, rinsing, and drying)? 3   Toileting, which includes using a toilet, bedpan, or urinal? 4   Putting on and taking off regular upper body clothing? 4   Taking care of personal grooming such as brushing teeth? 4   Eating meals? 4   6 Clicks Daily Activity Score 22   Functional Mobility   Sit to Stand Supervised   Bed, Chair, Wheelchair Transfer Supervised   Toilet Transfers Supervised   Transfer Method Stand Step   Mobility room and bathroom distances without AD   Activity Tolerance   Sitting Edge of Bed 15 min   Standing 10 min   Patient / Family Goals   Patient / Family Goal #1 to drink a beer   Education Group   Education Provided Back Safety;Transfers;Home Safety;Activities of Daily Living;Adaptive Equipment   Role of Occupational Therapist Patient Response Patient;Acceptance;Explanation   Back Safety Patient Response Patient;Acceptance;Explanation;Handout;Reinforcement Needed   Home Safety Patient Response Patient;Acceptance;Explanation;Action Demonstration;Reinforcement Needed   Transfers Patient Response Patient;Acceptance;Explanation;Action Demonstration   ADL Patient Response Patient;Acceptance;Explanation;Handout;Action Demonstration   Adaptive Equipment Patient Response Patient;Acceptance;Explanation;Action Demonstration   Problem List   Problem List Safety Awareness Deficits / Cognition   Anticipated Discharge Equipment and Recommendations   DC Equipment Recommendations None   Discharge Recommendations Anticipate that the patient will have no further occupational therapy needs after discharge from the hospital   Interdisciplinary Plan of Care Collaboration   IDT Collaboration with  Nursing;Family / Caregiver   Patient Position at End of Therapy In Bed;Family / Friend in Room;Phone within Reach;Tray Table within Reach;Call Light within Reach   Collaboration Comments OT findings and recs   Session Information   Date / Session Number  9/23 DONTAE martin  only, SM   Priority 0

## 2021-09-23 NOTE — DISCHARGE SUMMARY
Discharge Summary    CHIEF COMPLAINT ON ADMISSION  No chief complaint on file.      Reason for Admission  Radiculopathy, cervical region     Admission Date  9/22/2021    CODE STATUS  Full Code    HPI & HOSPITAL COURSE  11/13/20:  This is a very nice 45-year-old male who presents for right arm pain.  He is accompanied by his wife.  He is involved in construction.  He reports that he has had neck issues for many years.  Over the years, he has had various injuries and has had flare-ups of neck pain.  He typically uses chiropractic for this.  His pain became worse.  In the past he has been on narcotic and has, unfortunately, gone back to it.  He said that on the 4th of July he threw a chair.  He may have injured his right shoulder at the time, but since then he has had shoulder problems and right arm pain.  His neck pain is an 8.  He has right shoulder pain.  He gets pain going down the right arm in the C7 distribution.  It is mainly numb on the right C7 distribution with a little bit on the left.  He is due to get his shoulder operated on 12/28/2020 by Dr. Fam.  In essence, he has neck pain, right arm pain, and shoulder pain.     Epidurals have been requested but denied.  He is on narcotic from Dr. Santamaria.  He is taking pregabalin without any major effect at the moment.  It looks like he is only on 50-mg dosages.  He is not making progress.      He does smoke.  It is chewing tobacco and cigarettes.  It is only a few a day.  He has hypertension and sleep apnea.  This is a chronic problem.  He is  and was accompanied by his partner.  He has no known allergies.  He chews tobacco twice a day.  He does smoke 4 cigarettes a day as well.     8/18/2021  Patient returns.  He did extensive physical therapy.  An epidural was given 30 days of relief.  Had a shoulder surgery.  He is still in pain management.  He still getting right-sided neck pain with pain down the right arm with numbness and tingling.  It is a 9 out of  10.  He is failed all conservative measures.  I think it is reasonable to offer surgery.     On postoperative day #1 he is doing quite well.  He reports very mild neck pain at this time.  The right arm pain he was having is grossly improved at this time.  There is a residual amount of numbness in his right fingers.  His strength is improved.  He denies dysphagia.  He is eating and drink without complication.  He has mobilized and voiding.  Based on the above criteria he was deemed safe for discharge in stable condition.          No notes on file    Therefore, he is discharged in good and stable condition to home with close outpatient follow-up.    The patient recovered much more quickly than anticipated on admission.    Discharge Date  9/23/2021     FOLLOW UP ITEMS POST DISCHARGE  Follow up with our office in 2, 6, and 12 weeks.  Report to ER with any complications.  Call our office with any questions or concerns.  No blood thinners for 2 weeks.   Clear to shower Saturday, pat incision dry, no special creams or ointments.   Avoid bending, lifting and twisting.   Walk 4-6 times per day as tolerated to help prevent blood clots.    DISCHARGE DIAGNOSES  Principal Problem:    Radiculitis, cervical POA: Unknown  Active Problems:    Alcohol abuse, episodic POA: Yes    Chronic pain syndrome POA: Yes    DDD (degenerative disc disease), cervical POA: Yes    Hypertension POA: Unknown  Resolved Problems:    * No resolved hospital problems. *      FOLLOW UP  Future Appointments   Date Time Provider Department Center   10/6/2021  2:00 PM [facility] None     No follow-up provider specified.    MEDICATIONS ON DISCHARGE     Medication List      START taking these medications      Instructions   acetaminophen 500 MG Tabs  Commonly known as: TYLENOL   Take 2 Tablets by mouth every 6 hours for 14 days.  Dose: 1,000 mg     celecoxib 200 MG Caps  Commonly known as: CELEBREX   Take 1 Capsule by mouth 2 times a day for  30 days.  Dose: 200 mg     cephALEXin 500 MG Caps  Commonly known as: KEFLEX   Take 1 Capsule by mouth 4 times a day for 5 days.  Dose: 500 mg     cyclobenzaprine 10 mg Tabs  Commonly known as: Flexeril   Take 1 Tablet by mouth every 8 hours as needed for Muscle Spasms for up to 30 days.  Dose: 10 mg     oxyCODONE immediate release 10 MG immediate release tablet  Commonly known as: ROXICODONE   Take 1 Tablet by mouth every 6 hours as needed for up to 7 days.  Dose: 10 mg        CHANGE how you take these medications      Instructions   pregabalin 150 MG Caps  What changed: how much to take  Commonly known as: LYRICA   Take 1 Capsule by mouth every day for 30 days.  Dose: 150 mg        CONTINUE taking these medications      Instructions   albuterol 108 (90 Base) MCG/ACT Aers inhalation aerosol   Inhale 2 Puffs as needed for Shortness of Breath.  Dose: 2 Puff     docusate sodium 100 MG Caps  Commonly known as: COLACE   Take 100 mg by mouth every morning.  Dose: 100 mg     losartan 25 MG Tabs  Commonly known as: COZAAR   Take 25 mg by mouth at bedtime.  Dose: 25 mg     omeprazole 20 MG delayed-release capsule  Commonly known as: PRILOSEC   Take 20 mg by mouth every morning.  Dose: 20 mg            Allergies  No Known Allergies    DIET  Orders Placed This Encounter   Procedures   • Diet Order Diet: Regular     Standing Status:   Standing     Number of Occurrences:   1     Order Specific Question:   Diet:     Answer:   Regular [1]       ACTIVITY  As tolerated.  Weight bearing as tolerated    CONSULTATIONS  None    PROCEDURES  TITLE OF THE PROCEDURE:    · C5-6 and C6-7 anterior cervical discectomy and placement of Prestige artificial disk prosthesis.  · Microscopic microdissection.  · Fluoroscopic guidance for placement of the device.    LABORATORY  Lab Results   Component Value Date    SODIUM 134 (L) 09/23/2021    POTASSIUM 4.4 09/23/2021    CHLORIDE 99 09/23/2021    CO2 23 09/23/2021    GLUCOSE 133 (H) 09/23/2021    BUN  11 09/23/2021    CREATININE 0.62 09/23/2021        Lab Results   Component Value Date    WBC 18.3 (H) 09/23/2021    HEMOGLOBIN 15.6 09/23/2021    HEMATOCRIT 46.6 09/23/2021    PLATELETCT 305 09/23/2021        Total time of the discharge process exceeds 30 minutes.

## 2021-09-23 NOTE — DISCHARGE INSTRUCTIONS
Discharge Instructions    Discharged to home by car with relative. Discharged via wheelchair, hospital escort: Yes.  Special equipment needed: Soft C-Collar    Be sure to schedule a follow-up appointment with your primary care doctor or any specialists as instructed.     Discharge Plan:        I understand that a diet low in cholesterol, fat, and sodium is recommended for good health. Unless I have been given specific instructions below for another diet, I accept this instruction as my diet prescription.   Other diet: Regular    Special Instructions:     · Soft Cervical Collar for comfort  · No Repetitive over-head motions  · OTC stool softeners while on pain medication (mirilax, colace, sennakot)  · Take narcotics only as prescribed  · Follow up with Dr. Rolle's PA's    Anterior Cervical Diskectomy and Fusion, Care After  This sheet gives you information about how to care for yourself after your procedure. Your health care provider may also give you more specific instructions. If you have problems or questions, contact your health care provider.  What can I expect after the procedure?  After the procedure, it is common to have:  · Neck pain.  · Discomfort when swallowing.  · Slight hoarseness.  Follow these instructions at home:  If you have a neck brace:  · Wear it as told by your health care provider. Remove it only as told by your health care provider.  · Keep the brace clean and dry.  · Ask your health care provider if you should remove the brace to bathe or shower.  Incision care    · Follow instructions from your health care provider about how to take care of your incision. Make sure you:  ? Wash your hands with soap and water before and after you change your bandage (dressing). If soap and water are not available, use hand .  ? Change your dressing as told by your health care provider.  ? Leave stitches (sutures), skin glue, or adhesive strips in place. These skin closures may need to stay in place  for 2 weeks or longer. If adhesive strip edges start to loosen and curl up, you may trim the loose edges. Do not remove adhesive strips completely unless your health care provider tells you to do that.  · Check your incision area every day for signs of infection. Check for:  ? Redness, swelling, or pain.  ? Fluid or blood.  ? Warmth.  ? Pus or a bad smell.  Managing pain, stiffness, and swelling    · Take over-the-counter and prescription medicines only as told by your health care provider.  · If directed, put ice on the injured area.  ? If you have a removable brace, remove it as told by your health care provider.  ? Put ice in a plastic bag.  ? Place a towel between your skin and the bag.  ? Leave the ice on for 20 minutes, 2-3 times a day.  Activity    · Return to your normal activities as told by your health care provider. Ask your health care provider what activities are safe for you.  · Do exercises as told by your health care provider.  · Do not take baths, swim, or use a hot tub until your health care provider approves.  · Do not lift anything that is heavier than 10 lb (4.5 kg), or the limit that you are told, until your health care provider says that it is safe.  General instructions  · Ask your health care provider if the medicine prescribed to you:  ? Requires you to avoid driving or using heavy machinery.  ? Can cause constipation. You may need to take actions to prevent or treat constipation, such as:  § Drink enough fluid to keep your urine pale yellow.  § Take over-the-counter or prescription medicines.  § Eat foods that are high in fiber, such as beans, whole grains, and fresh fruits and vegetables.  § Limit foods that are high in fat and processed sugars, such as fried and sweet foods.  · Do not use any products that contain nicotine or tobacco, such as cigarettes, e-cigarettes, and chewing tobacco. These can delay healing. If you need help quitting, ask your health care provider.  · Keep all  follow-up visits and physical therapy appointments as told by your health care provider. This is important.  Contact a health care provider if you have:  · A fever.  · Redness, swelling, or pain around your incision.  · Fluid or blood coming from your incision.  · Pus or a bad smell coming from your incision.  · Pain that is not controlled by your pain medicine.  · Increasing hoarseness or trouble swallowing.  Get help right away if you have:  · Severe pain.  · Sudden numbness or weakness in your arms.  · Warmth, tenderness, or swelling in your calf.  · Chest pain.  · Difficulty breathing.  Summary  · After the procedure, it is common to have neck pain, discomfort when swallowing, and slight hoarseness.  · Follow instructions from your health care provider about how to take care of your incision.  · Check your incision area every day for signs of infection.  · Return to your normal activities as told by your health care provider. Ask your health care provider what activities are safe for you.  · Contact a health care provider if you have signs of infection at your incision.  This information is not intended to replace advice given to you by your health care provider. Make sure you discuss any questions you have with your health care provider.  Document Released: 01/13/2017 Document Revised: 09/12/2019 Document Reviewed: 09/12/2019  [employer] Patient Education © 2020 [employer]        · Is patient discharged on Warfarin / Coumadin?   No     Depression / Suicide Risk    As you are discharged from this [facility] facility, it is important to learn how to keep safe from harming yourself.    Recognize the warning signs:  · Abrupt changes in personality, positive or negative- including increase in energy   · Giving away possessions  · Change in eating patterns- significant weight changes-  positive or negative  · Change in sleeping patterns- unable to sleep or sleeping all the time   · Unwillingness or inability to  communicate  · Depression  · Unusual sadness, discouragement and loneliness  · Talk of wanting to die  · Neglect of personal appearance   · Rebelliousness- reckless behavior  · Withdrawal from people/activities they love  · Confusion- inability to concentrate     If you or a loved one observes any of these behaviors or has concerns about self-harm, here's what you can do:  · Talk about it- your feelings and reasons for harming yourself  · Remove any means that you might use to hurt yourself (examples: pills, rope, extension cords, firearm)  · Get professional help from the community (Mental Health, Substance Abuse, psychological counseling)  · Do not be alone:Call your Safe Contact- someone whom you trust who will be there for you.  · Call your local CRISIS HOTLINE 899-1289 or 475-868-8866  · Call your local Children's Mobile Crisis Response Team [place] (030) 380-1240 or www.[employer]  · Call the toll free National Suicide Prevention Hotlines   · National Suicide Prevention Lifeline 940-461-ONTK (1877)  · National Hope Line Network 800-SUICIDE (467-8795)

## 2021-09-23 NOTE — PROGRESS NOTES
Discharge patient to home via car with wife A&Ox4; discussed discharge summary with understanding; prescriptions sent to pharmacy of choice. Pharmacy called to make sure they received the scripts and as per pharmacist scripts are in.

## 2021-09-27 ENCOUNTER — TELEPHONE (OUTPATIENT)
Dept: NEUROSURGERY | Facility: MEDICAL CENTER | Age: 46
End: 2021-09-27

## 2021-09-27 DIAGNOSIS — M54.2 NECK PAIN: ICD-10-CM

## 2021-09-27 NOTE — TELEPHONE ENCOUNTER
Postoperative check-in.  I did leave a voicemail.  He is to call us back.    9/29/2021  He is doing well at this time. No concerns about the wound. Wants to go back to work, I advised him to wait until his next appointment 2/6. We will attempt to get xrays prior to that appt. His arm is doing better.     Will send X-ray order to [facility] [address].

## 2022-02-17 ENCOUNTER — APPOINTMENT (RX ONLY)
Dept: URBAN - METROPOLITAN AREA CLINIC 22 | Facility: CLINIC | Age: 47
Setting detail: DERMATOLOGY
End: 2022-02-17

## 2022-02-17 DIAGNOSIS — L82.1 OTHER SEBORRHEIC KERATOSIS: ICD-10-CM

## 2022-02-17 DIAGNOSIS — L81.4 OTHER MELANIN HYPERPIGMENTATION: ICD-10-CM

## 2022-02-17 DIAGNOSIS — L57.0 ACTINIC KERATOSIS: ICD-10-CM

## 2022-02-17 PROCEDURE — ? LIQUID NITROGEN

## 2022-02-17 PROCEDURE — ? PHOTO-DOCUMENTATION

## 2022-02-17 PROCEDURE — ? ADDITIONAL NOTES

## 2022-02-17 PROCEDURE — ? COUNSELING

## 2022-02-17 PROCEDURE — 17000 DESTRUCT PREMALG LESION: CPT

## 2022-02-17 PROCEDURE — 17003 DESTRUCT PREMALG LES 2-14: CPT

## 2022-02-17 PROCEDURE — 99203 OFFICE O/P NEW LOW 30 MIN: CPT | Mod: 25

## 2022-02-17 ASSESSMENT — LOCATION SIMPLE DESCRIPTION DERM
LOCATION SIMPLE: LEFT UPPER ARM
LOCATION SIMPLE: RIGHT PRETIBIAL REGION
LOCATION SIMPLE: LEFT PRETIBIAL REGION
LOCATION SIMPLE: RIGHT TEMPLE
LOCATION SIMPLE: RIGHT UPPER ARM
LOCATION SIMPLE: LEFT EAR

## 2022-02-17 ASSESSMENT — LOCATION ZONE DERM
LOCATION ZONE: EAR
LOCATION ZONE: ARM
LOCATION ZONE: FACE
LOCATION ZONE: LEG

## 2022-02-17 ASSESSMENT — LOCATION DETAILED DESCRIPTION DERM
LOCATION DETAILED: RIGHT DISTAL POSTERIOR UPPER ARM
LOCATION DETAILED: RIGHT PROXIMAL PRETIBIAL REGION
LOCATION DETAILED: RIGHT CENTRAL TEMPLE
LOCATION DETAILED: LEFT PROXIMAL POSTERIOR UPPER ARM
LOCATION DETAILED: LEFT PROXIMAL PRETIBIAL REGION
LOCATION DETAILED: LEFT SUPERIOR HELIX

## 2022-02-17 NOTE — PROCEDURE: ADDITIONAL NOTES
Additional Notes: Agrees to RTC for FBSE and to monitor sites.
Render Risk Assessment In Note?: no
Detail Level: Simple

## 2022-02-17 NOTE — PROCEDURE: LIQUID NITROGEN
Detail Level: Detailed
Consent: The patient's consent was obtained including but not limited to risks of crusting, scabbing, blistering, scarring, darker or lighter pigmentary change, recurrence, incomplete removal and infection.
Show Aperture Variable?: Yes
Render Note In Bullet Format When Appropriate: No
Post-Care Instructions: I reviewed with the patient in detail post-care instructions. Patient is to wear sunprotection, and avoid picking at any of the treated lesions. Pt may apply Vaseline to crusted or scabbing areas.
Duration Of Freeze Thaw-Cycle (Seconds): 3
Number Of Freeze-Thaw Cycles: 2 freeze-thaw cycles

## 2023-12-20 ENCOUNTER — APPOINTMENT (OUTPATIENT)
Dept: URGENT CARE | Facility: PHYSICIAN GROUP | Age: 48
End: 2023-12-20
Payer: COMMERCIAL

## 2024-03-06 PROBLEM — R53.83 FATIGUE: Status: ACTIVE | Noted: 2023-01-19

## 2024-03-06 PROBLEM — E78.1 HIGH TRIGLYCERIDES: Status: ACTIVE | Noted: 2023-11-20

## 2024-03-06 PROBLEM — K21.9 GASTROESOPHAGEAL REFLUX DISEASE: Status: ACTIVE | Noted: 2022-01-20

## 2024-03-06 PROBLEM — E78.5 DYSLIPIDEMIA: Status: ACTIVE | Noted: 2023-11-20

## 2024-03-06 PROBLEM — J45.909 EXTRINSIC ASTHMA: Status: ACTIVE | Noted: 2022-01-20

## 2024-03-06 PROBLEM — E55.9 VITAMIN D DEFICIENCY, UNSPECIFIED: Status: ACTIVE | Noted: 2022-01-20

## 2024-07-25 ENCOUNTER — OFFICE VISIT (OUTPATIENT)
Dept: CARDIOLOGY | Facility: MEDICAL CENTER | Age: 49
End: 2024-07-25
Attending: INTERNAL MEDICINE
Payer: COMMERCIAL

## 2024-07-25 VITALS
HEART RATE: 67 BPM | HEIGHT: 69 IN | SYSTOLIC BLOOD PRESSURE: 108 MMHG | BODY MASS INDEX: 33.47 KG/M2 | WEIGHT: 226 LBS | OXYGEN SATURATION: 96 % | DIASTOLIC BLOOD PRESSURE: 70 MMHG

## 2024-07-25 DIAGNOSIS — R07.89 OTHER CHEST PAIN: ICD-10-CM

## 2024-07-25 DIAGNOSIS — Z82.49 FAMILY HISTORY OF CORONARY ARTERY DISEASE: ICD-10-CM

## 2024-07-25 DIAGNOSIS — E78.5 DYSLIPIDEMIA: ICD-10-CM

## 2024-07-25 DIAGNOSIS — I10 HYPERTENSION, UNSPECIFIED TYPE: ICD-10-CM

## 2024-07-25 PROCEDURE — 3074F SYST BP LT 130 MM HG: CPT | Performed by: INTERNAL MEDICINE

## 2024-07-25 PROCEDURE — 99211 OFF/OP EST MAY X REQ PHY/QHP: CPT | Performed by: INTERNAL MEDICINE

## 2024-07-25 PROCEDURE — 99204 OFFICE O/P NEW MOD 45 MIN: CPT | Performed by: INTERNAL MEDICINE

## 2024-07-25 PROCEDURE — 93005 ELECTROCARDIOGRAM TRACING: CPT | Performed by: INTERNAL MEDICINE

## 2024-07-25 PROCEDURE — 3078F DIAST BP <80 MM HG: CPT | Performed by: INTERNAL MEDICINE

## 2024-07-31 LAB — EKG IMPRESSION: NORMAL

## 2024-08-01 ENCOUNTER — HOSPITAL ENCOUNTER (INPATIENT)
Facility: MEDICAL CENTER | Age: 49
LOS: 2 days | DRG: 641 | End: 2024-08-03
Attending: STUDENT IN AN ORGANIZED HEALTH CARE EDUCATION/TRAINING PROGRAM | Admitting: STUDENT IN AN ORGANIZED HEALTH CARE EDUCATION/TRAINING PROGRAM
Payer: COMMERCIAL

## 2024-08-01 ENCOUNTER — APPOINTMENT (OUTPATIENT)
Dept: RADIOLOGY | Facility: MEDICAL CENTER | Age: 49
DRG: 641 | End: 2024-08-01
Attending: STUDENT IN AN ORGANIZED HEALTH CARE EDUCATION/TRAINING PROGRAM
Payer: COMMERCIAL

## 2024-08-01 DIAGNOSIS — R07.9 CHEST PAIN, UNSPECIFIED TYPE: ICD-10-CM

## 2024-08-01 DIAGNOSIS — I50.9 ACUTE CONGESTIVE HEART FAILURE, UNSPECIFIED HEART FAILURE TYPE (HCC): ICD-10-CM

## 2024-08-01 PROBLEM — E66.01 MORBID OBESITY (HCC): Status: ACTIVE | Noted: 2024-08-01

## 2024-08-01 PROBLEM — I16.0 HYPERTENSIVE URGENCY: Status: ACTIVE | Noted: 2024-08-01

## 2024-08-01 PROBLEM — E87.70 FLUID OVERLOAD: Status: ACTIVE | Noted: 2024-08-01

## 2024-08-01 LAB
ALBUMIN SERPL BCP-MCNC: 3.8 G/DL (ref 3.2–4.9)
ALBUMIN/GLOB SERPL: 1.6 G/DL
ALP SERPL-CCNC: 50 U/L (ref 30–99)
ALT SERPL-CCNC: 35 U/L (ref 2–50)
ANION GAP SERPL CALC-SCNC: 11 MMOL/L (ref 7–16)
AST SERPL-CCNC: 20 U/L (ref 12–45)
BASOPHILS # BLD AUTO: 0.6 % (ref 0–1.8)
BASOPHILS # BLD: 0.04 K/UL (ref 0–0.12)
BILIRUB SERPL-MCNC: <0.2 MG/DL (ref 0.1–1.5)
BUN SERPL-MCNC: 14 MG/DL (ref 8–22)
CALCIUM ALBUM COR SERPL-MCNC: 9.4 MG/DL (ref 8.5–10.5)
CALCIUM SERPL-MCNC: 9.2 MG/DL (ref 8.5–10.5)
CHLORIDE SERPL-SCNC: 108 MMOL/L (ref 96–112)
CO2 SERPL-SCNC: 23 MMOL/L (ref 20–33)
CREAT SERPL-MCNC: 0.6 MG/DL (ref 0.5–1.4)
D DIMER PPP IA.FEU-MCNC: 0.33 UG/ML (FEU) (ref 0–0.5)
EKG IMPRESSION: NORMAL
EOSINOPHIL # BLD AUTO: 0.26 K/UL (ref 0–0.51)
EOSINOPHIL NFR BLD: 3.7 % (ref 0–6.9)
ERYTHROCYTE [DISTWIDTH] IN BLOOD BY AUTOMATED COUNT: 42 FL (ref 35.9–50)
GFR SERPLBLD CREATININE-BSD FMLA CKD-EPI: 118 ML/MIN/1.73 M 2
GLOBULIN SER CALC-MCNC: 2.4 G/DL (ref 1.9–3.5)
GLUCOSE SERPL-MCNC: 108 MG/DL (ref 65–99)
HCT VFR BLD AUTO: 38.7 % (ref 42–52)
HGB BLD-MCNC: 13.2 G/DL (ref 14–18)
IMM GRANULOCYTES # BLD AUTO: 0.03 K/UL (ref 0–0.11)
IMM GRANULOCYTES NFR BLD AUTO: 0.4 % (ref 0–0.9)
LYMPHOCYTES # BLD AUTO: 2.52 K/UL (ref 1–4.8)
LYMPHOCYTES NFR BLD: 35.9 % (ref 22–41)
MCH RBC QN AUTO: 31.2 PG (ref 27–33)
MCHC RBC AUTO-ENTMCNC: 34.1 G/DL (ref 32.3–36.5)
MCV RBC AUTO: 91.5 FL (ref 81.4–97.8)
MONOCYTES # BLD AUTO: 0.76 K/UL (ref 0–0.85)
MONOCYTES NFR BLD AUTO: 10.8 % (ref 0–13.4)
NEUTROPHILS # BLD AUTO: 3.41 K/UL (ref 1.82–7.42)
NEUTROPHILS NFR BLD: 48.6 % (ref 44–72)
NRBC # BLD AUTO: 0 K/UL
NRBC BLD-RTO: 0 /100 WBC (ref 0–0.2)
NT-PROBNP SERPL IA-MCNC: 277 PG/ML (ref 0–125)
PLATELET # BLD AUTO: 282 K/UL (ref 164–446)
PMV BLD AUTO: 10.1 FL (ref 9–12.9)
POTASSIUM SERPL-SCNC: 3.8 MMOL/L (ref 3.6–5.5)
PROT SERPL-MCNC: 6.2 G/DL (ref 6–8.2)
RBC # BLD AUTO: 4.23 M/UL (ref 4.7–6.1)
SODIUM SERPL-SCNC: 142 MMOL/L (ref 135–145)
TROPONIN T SERPL-MCNC: 10 NG/L (ref 6–19)
TROPONIN T SERPL-MCNC: 9 NG/L (ref 6–19)
WBC # BLD AUTO: 7 K/UL (ref 4.8–10.8)

## 2024-08-01 PROCEDURE — 36415 COLL VENOUS BLD VENIPUNCTURE: CPT

## 2024-08-01 PROCEDURE — 99285 EMERGENCY DEPT VISIT HI MDM: CPT

## 2024-08-01 PROCEDURE — 84484 ASSAY OF TROPONIN QUANT: CPT | Mod: 91

## 2024-08-01 PROCEDURE — 93005 ELECTROCARDIOGRAM TRACING: CPT

## 2024-08-01 PROCEDURE — 83880 ASSAY OF NATRIURETIC PEPTIDE: CPT

## 2024-08-01 PROCEDURE — 99223 1ST HOSP IP/OBS HIGH 75: CPT | Performed by: STUDENT IN AN ORGANIZED HEALTH CARE EDUCATION/TRAINING PROGRAM

## 2024-08-01 PROCEDURE — 93005 ELECTROCARDIOGRAM TRACING: CPT | Performed by: STUDENT IN AN ORGANIZED HEALTH CARE EDUCATION/TRAINING PROGRAM

## 2024-08-01 PROCEDURE — 700102 HCHG RX REV CODE 250 W/ 637 OVERRIDE(OP): Performed by: STUDENT IN AN ORGANIZED HEALTH CARE EDUCATION/TRAINING PROGRAM

## 2024-08-01 PROCEDURE — 85379 FIBRIN DEGRADATION QUANT: CPT

## 2024-08-01 PROCEDURE — A9270 NON-COVERED ITEM OR SERVICE: HCPCS | Performed by: STUDENT IN AN ORGANIZED HEALTH CARE EDUCATION/TRAINING PROGRAM

## 2024-08-01 PROCEDURE — 80053 COMPREHEN METABOLIC PANEL: CPT

## 2024-08-01 PROCEDURE — 71045 X-RAY EXAM CHEST 1 VIEW: CPT

## 2024-08-01 PROCEDURE — 85025 COMPLETE CBC W/AUTO DIFF WBC: CPT

## 2024-08-01 PROCEDURE — 700111 HCHG RX REV CODE 636 W/ 250 OVERRIDE (IP): Mod: JZ | Performed by: STUDENT IN AN ORGANIZED HEALTH CARE EDUCATION/TRAINING PROGRAM

## 2024-08-01 PROCEDURE — 96374 THER/PROPH/DIAG INJ IV PUSH: CPT

## 2024-08-01 PROCEDURE — 770020 HCHG ROOM/CARE - TELE (206)

## 2024-08-01 RX ORDER — NITROGLYCERIN 0.4 MG/1
0.4 TABLET SUBLINGUAL
Status: DISCONTINUED | OUTPATIENT
Start: 2024-08-01 | End: 2024-08-03 | Stop reason: HOSPADM

## 2024-08-01 RX ORDER — ACETAMINOPHEN 325 MG/1
650 TABLET ORAL EVERY 6 HOURS PRN
Status: DISCONTINUED | OUTPATIENT
Start: 2024-08-01 | End: 2024-08-03 | Stop reason: HOSPADM

## 2024-08-01 RX ORDER — NITROGLYCERIN 0.4 MG/1
0.4 TABLET SUBLINGUAL
Status: DISCONTINUED | OUTPATIENT
Start: 2024-08-01 | End: 2024-08-01

## 2024-08-01 RX ORDER — LOSARTAN POTASSIUM 50 MG/1
50 TABLET ORAL DAILY
Status: DISCONTINUED | OUTPATIENT
Start: 2024-08-02 | End: 2024-08-03 | Stop reason: HOSPADM

## 2024-08-01 RX ORDER — FUROSEMIDE 10 MG/ML
40 INJECTION INTRAMUSCULAR; INTRAVENOUS ONCE
Status: COMPLETED | OUTPATIENT
Start: 2024-08-01 | End: 2024-08-01

## 2024-08-01 RX ORDER — CALCIUM CARBONATE 500 MG/1
500 TABLET, CHEWABLE ORAL ONCE
Status: COMPLETED | OUTPATIENT
Start: 2024-08-02 | End: 2024-08-02

## 2024-08-01 RX ORDER — HYDRALAZINE HYDROCHLORIDE 20 MG/ML
10 INJECTION INTRAMUSCULAR; INTRAVENOUS EVERY 4 HOURS PRN
Status: DISCONTINUED | OUTPATIENT
Start: 2024-08-01 | End: 2024-08-03 | Stop reason: HOSPADM

## 2024-08-01 RX ORDER — ALBUTEROL SULFATE 90 UG/1
2 AEROSOL, METERED RESPIRATORY (INHALATION) PRN
Status: DISCONTINUED | OUTPATIENT
Start: 2024-08-01 | End: 2024-08-02

## 2024-08-01 RX ORDER — ASPIRIN 81 MG/1
324 TABLET, CHEWABLE ORAL ONCE
Status: COMPLETED | OUTPATIENT
Start: 2024-08-01 | End: 2024-08-01

## 2024-08-01 RX ORDER — FUROSEMIDE 20 MG
20 TABLET ORAL
Status: DISCONTINUED | OUTPATIENT
Start: 2024-08-02 | End: 2024-08-02

## 2024-08-01 RX ORDER — FUROSEMIDE 10 MG/ML
40 INJECTION INTRAMUSCULAR; INTRAVENOUS
Status: DISCONTINUED | OUTPATIENT
Start: 2024-08-02 | End: 2024-08-01

## 2024-08-01 RX ADMIN — ASPIRIN 324 MG: 81 TABLET, CHEWABLE ORAL at 23:34

## 2024-08-01 RX ADMIN — NITROGLYCERIN 0.4 MG: 0.4 TABLET, ORALLY DISINTEGRATING SUBLINGUAL at 23:33

## 2024-08-01 RX ADMIN — FUROSEMIDE 40 MG: 10 INJECTION, SOLUTION INTRAVENOUS at 23:34

## 2024-08-01 ASSESSMENT — ENCOUNTER SYMPTOMS
SHORTNESS OF BREATH: 1
GASTROINTESTINAL NEGATIVE: 1
PSYCHIATRIC NEGATIVE: 1
CONSTITUTIONAL NEGATIVE: 1
EYES NEGATIVE: 1
MUSCULOSKELETAL NEGATIVE: 1
NEUROLOGICAL NEGATIVE: 1

## 2024-08-02 ENCOUNTER — APPOINTMENT (OUTPATIENT)
Dept: CARDIOLOGY | Facility: MEDICAL CENTER | Age: 49
DRG: 641 | End: 2024-08-02
Attending: HOSPITALIST
Payer: COMMERCIAL

## 2024-08-02 ENCOUNTER — APPOINTMENT (OUTPATIENT)
Dept: RADIOLOGY | Facility: MEDICAL CENTER | Age: 49
DRG: 641 | End: 2024-08-02
Attending: STUDENT IN AN ORGANIZED HEALTH CARE EDUCATION/TRAINING PROGRAM
Payer: COMMERCIAL

## 2024-08-02 LAB
AMPHET UR QL SCN: NEGATIVE
ANION GAP SERPL CALC-SCNC: 15 MMOL/L (ref 7–16)
BARBITURATES UR QL SCN: NEGATIVE
BENZODIAZ UR QL SCN: NEGATIVE
BUN SERPL-MCNC: 13 MG/DL (ref 8–22)
BZE UR QL SCN: NEGATIVE
CALCIUM SERPL-MCNC: 9.2 MG/DL (ref 8.5–10.5)
CANNABINOIDS UR QL SCN: NEGATIVE
CHLORIDE SERPL-SCNC: 102 MMOL/L (ref 96–112)
CHOLEST SERPL-MCNC: 183 MG/DL (ref 100–199)
CO2 SERPL-SCNC: 22 MMOL/L (ref 20–33)
CREAT SERPL-MCNC: 0.6 MG/DL (ref 0.5–1.4)
ERYTHROCYTE [DISTWIDTH] IN BLOOD BY AUTOMATED COUNT: 41.6 FL (ref 35.9–50)
EST. AVERAGE GLUCOSE BLD GHB EST-MCNC: 120 MG/DL
FENTANYL UR QL: NEGATIVE
GFR SERPLBLD CREATININE-BSD FMLA CKD-EPI: 118 ML/MIN/1.73 M 2
GLUCOSE SERPL-MCNC: 100 MG/DL (ref 65–99)
HBA1C MFR BLD: 5.8 % (ref 4–5.6)
HCT VFR BLD AUTO: 41 % (ref 42–52)
HDLC SERPL-MCNC: 39 MG/DL
HGB BLD-MCNC: 13.9 G/DL (ref 14–18)
LDLC SERPL CALC-MCNC: 85 MG/DL
LIPASE SERPL-CCNC: 74 U/L (ref 11–82)
LV EJECT FRACT  99904: 75
LV EJECT FRACT MOD 2C 99903: 75.35
LV EJECT FRACT MOD 4C 99902: 74.96
LV EJECT FRACT MOD BP 99901: 75.08
MCH RBC QN AUTO: 30.7 PG (ref 27–33)
MCHC RBC AUTO-ENTMCNC: 33.9 G/DL (ref 32.3–36.5)
MCV RBC AUTO: 90.5 FL (ref 81.4–97.8)
METHADONE UR QL SCN: NEGATIVE
OPIATES UR QL SCN: NEGATIVE
OXYCODONE UR QL SCN: NEGATIVE
PCP UR QL SCN: NEGATIVE
PLATELET # BLD AUTO: 310 K/UL (ref 164–446)
PMV BLD AUTO: 10.5 FL (ref 9–12.9)
POTASSIUM SERPL-SCNC: 3.8 MMOL/L (ref 3.6–5.5)
PROPOXYPH UR QL SCN: NEGATIVE
RBC # BLD AUTO: 4.53 M/UL (ref 4.7–6.1)
SODIUM SERPL-SCNC: 139 MMOL/L (ref 135–145)
TRIGL SERPL-MCNC: 297 MG/DL (ref 0–149)
TROPONIN T SERPL-MCNC: 6 NG/L (ref 6–19)
WBC # BLD AUTO: 7.3 K/UL (ref 4.8–10.8)

## 2024-08-02 PROCEDURE — 700117 HCHG RX CONTRAST REV CODE 255: Performed by: HOSPITALIST

## 2024-08-02 PROCEDURE — 700111 HCHG RX REV CODE 636 W/ 250 OVERRIDE (IP)

## 2024-08-02 PROCEDURE — A9270 NON-COVERED ITEM OR SERVICE: HCPCS

## 2024-08-02 PROCEDURE — 83036 HEMOGLOBIN GLYCOSYLATED A1C: CPT

## 2024-08-02 PROCEDURE — 80307 DRUG TEST PRSMV CHEM ANLYZR: CPT

## 2024-08-02 PROCEDURE — 99232 SBSQ HOSP IP/OBS MODERATE 35: CPT | Performed by: HOSPITALIST

## 2024-08-02 PROCEDURE — 36415 COLL VENOUS BLD VENIPUNCTURE: CPT

## 2024-08-02 PROCEDURE — 700102 HCHG RX REV CODE 250 W/ 637 OVERRIDE(OP)

## 2024-08-02 PROCEDURE — 93306 TTE W/DOPPLER COMPLETE: CPT | Mod: 26 | Performed by: INTERNAL MEDICINE

## 2024-08-02 PROCEDURE — 80061 LIPID PANEL: CPT

## 2024-08-02 PROCEDURE — 93306 TTE W/DOPPLER COMPLETE: CPT

## 2024-08-02 PROCEDURE — 770020 HCHG ROOM/CARE - TELE (206)

## 2024-08-02 PROCEDURE — A9270 NON-COVERED ITEM OR SERVICE: HCPCS | Performed by: STUDENT IN AN ORGANIZED HEALTH CARE EDUCATION/TRAINING PROGRAM

## 2024-08-02 PROCEDURE — 700102 HCHG RX REV CODE 250 W/ 637 OVERRIDE(OP): Performed by: STUDENT IN AN ORGANIZED HEALTH CARE EDUCATION/TRAINING PROGRAM

## 2024-08-02 PROCEDURE — 84484 ASSAY OF TROPONIN QUANT: CPT

## 2024-08-02 PROCEDURE — 700111 HCHG RX REV CODE 636 W/ 250 OVERRIDE (IP): Mod: JZ

## 2024-08-02 PROCEDURE — 85027 COMPLETE CBC AUTOMATED: CPT

## 2024-08-02 PROCEDURE — A9502 TC99M TETROFOSMIN: HCPCS

## 2024-08-02 PROCEDURE — 83690 ASSAY OF LIPASE: CPT

## 2024-08-02 PROCEDURE — 80048 BASIC METABOLIC PNL TOTAL CA: CPT

## 2024-08-02 RX ORDER — AMINOPHYLLINE 25 MG/ML
100 INJECTION, SOLUTION INTRAVENOUS
Status: DISCONTINUED | OUTPATIENT
Start: 2024-08-02 | End: 2024-08-03 | Stop reason: HOSPADM

## 2024-08-02 RX ORDER — ENOXAPARIN SODIUM 100 MG/ML
40 INJECTION SUBCUTANEOUS DAILY
Status: DISCONTINUED | OUTPATIENT
Start: 2024-08-02 | End: 2024-08-03 | Stop reason: HOSPADM

## 2024-08-02 RX ORDER — REGADENOSON 0.08 MG/ML
INJECTION, SOLUTION INTRAVENOUS
Status: COMPLETED
Start: 2024-08-02 | End: 2024-08-02

## 2024-08-02 RX ORDER — REGADENOSON 0.08 MG/ML
0.4 INJECTION, SOLUTION INTRAVENOUS ONCE
Status: COMPLETED | OUTPATIENT
Start: 2024-08-02 | End: 2024-08-02

## 2024-08-02 RX ORDER — ALBUTEROL SULFATE 90 UG/1
2 AEROSOL, METERED RESPIRATORY (INHALATION) EVERY 6 HOURS PRN
Status: DISCONTINUED | OUTPATIENT
Start: 2024-08-02 | End: 2024-08-03 | Stop reason: HOSPADM

## 2024-08-02 RX ORDER — IBUPROFEN 200 MG
400 TABLET ORAL EVERY 6 HOURS PRN
COMMUNITY

## 2024-08-02 RX ORDER — DIPHENHYDRAMINE HCL 25 MG
25 TABLET ORAL ONCE
Status: ON HOLD | COMMUNITY
End: 2024-08-03

## 2024-08-02 RX ORDER — AZITHROMYCIN 500 MG/1
500 TABLET, FILM COATED ORAL DAILY
Status: ON HOLD | COMMUNITY
End: 2024-08-03

## 2024-08-02 RX ORDER — FUROSEMIDE 10 MG/ML
40 INJECTION INTRAMUSCULAR; INTRAVENOUS
Status: DISCONTINUED | OUTPATIENT
Start: 2024-08-02 | End: 2024-08-03

## 2024-08-02 RX ORDER — IBUPROFEN 600 MG/1
600 TABLET, FILM COATED ORAL EVERY 6 HOURS PRN
Status: DISCONTINUED | OUTPATIENT
Start: 2024-08-02 | End: 2024-08-03 | Stop reason: HOSPADM

## 2024-08-02 RX ADMIN — ENOXAPARIN SODIUM 40 MG: 100 INJECTION SUBCUTANEOUS at 18:08

## 2024-08-02 RX ADMIN — HUMAN ALBUMIN MICROSPHERES AND PERFLUTREN 3 ML: 10; .22 INJECTION, SOLUTION INTRAVENOUS at 17:30

## 2024-08-02 RX ADMIN — LOSARTAN POTASSIUM 50 MG: 50 TABLET, FILM COATED ORAL at 05:21

## 2024-08-02 RX ADMIN — OMEPRAZOLE 20 MG: 20 CAPSULE, DELAYED RELEASE ORAL at 15:16

## 2024-08-02 RX ADMIN — REGADENOSON 0.4 MG: 0.08 INJECTION, SOLUTION INTRAVENOUS at 13:41

## 2024-08-02 RX ADMIN — FUROSEMIDE 20 MG: 20 TABLET ORAL at 05:21

## 2024-08-02 RX ADMIN — ACETAMINOPHEN 650 MG: 325 TABLET ORAL at 06:11

## 2024-08-02 RX ADMIN — TIZANIDINE 4 MG: 4 TABLET ORAL at 18:08

## 2024-08-02 RX ADMIN — IBUPROFEN 600 MG: 600 TABLET, FILM COATED ORAL at 20:09

## 2024-08-02 RX ADMIN — FUROSEMIDE 40 MG: 10 INJECTION, SOLUTION INTRAVENOUS at 10:21

## 2024-08-02 RX ADMIN — ANTACID TABLETS 500 MG: 500 TABLET, CHEWABLE ORAL at 00:34

## 2024-08-02 SDOH — ECONOMIC STABILITY: TRANSPORTATION INSECURITY
IN THE PAST 12 MONTHS, HAS THE LACK OF TRANSPORTATION KEPT YOU FROM MEDICAL APPOINTMENTS OR FROM GETTING MEDICATIONS?: NO

## 2024-08-02 SDOH — ECONOMIC STABILITY: TRANSPORTATION INSECURITY
IN THE PAST 12 MONTHS, HAS LACK OF RELIABLE TRANSPORTATION KEPT YOU FROM MEDICAL APPOINTMENTS, MEETINGS, WORK OR FROM GETTING THINGS NEEDED FOR DAILY LIVING?: NO

## 2024-08-02 ASSESSMENT — LIFESTYLE VARIABLES
ALCOHOL_USE: NO
HAVE YOU EVER FELT YOU SHOULD CUT DOWN ON YOUR DRINKING: NO
DOES PATIENT WANT TO STOP DRINKING: NO
CONSUMPTION TOTAL: POSITIVE
TOTAL SCORE: 0
AVERAGE NUMBER OF DAYS PER WEEK YOU HAVE A DRINK CONTAINING ALCOHOL: 3
EVER HAD A DRINK FIRST THING IN THE MORNING TO STEADY YOUR NERVES TO GET RID OF A HANGOVER: NO
ON A TYPICAL DAY WHEN YOU DRINK ALCOHOL HOW MANY DRINKS DO YOU HAVE: 12
DOES PATIENT WANT TO STOP DRINKING: NO
HAVE PEOPLE ANNOYED YOU BY CRITICIZING YOUR DRINKING: NO
HAVE YOU EVER FELT YOU SHOULD CUT DOWN ON YOUR DRINKING: NO
TOTAL SCORE: 0
EVER FELT BAD OR GUILTY ABOUT YOUR DRINKING: NO
AVERAGE NUMBER OF DAYS PER WEEK YOU HAVE A DRINK CONTAINING ALCOHOL: 3
ALCOHOL_USE: NO
HOW MANY TIMES IN THE PAST YEAR HAVE YOU HAD 5 OR MORE DRINKS IN A DAY: 100
CONSUMPTION TOTAL: INCOMPLETE
TOTAL SCORE: 0
ON A TYPICAL DAY WHEN YOU DRINK ALCOHOL HOW MANY DRINKS DO YOU HAVE: 12
EVER FELT BAD OR GUILTY ABOUT YOUR DRINKING: NO
HAVE PEOPLE ANNOYED YOU BY CRITICIZING YOUR DRINKING: NO
HOW MANY TIMES IN THE PAST YEAR HAVE YOU HAD 5 OR MORE DRINKS IN A DAY: 100

## 2024-08-02 ASSESSMENT — COGNITIVE AND FUNCTIONAL STATUS - GENERAL
DAILY ACTIVITIY SCORE: 24
MOBILITY SCORE: 24
SUGGESTED CMS G CODE MODIFIER MOBILITY: CH
SUGGESTED CMS G CODE MODIFIER DAILY ACTIVITY: CH

## 2024-08-02 ASSESSMENT — PAIN DESCRIPTION - PAIN TYPE
TYPE: ACUTE PAIN
TYPE: ACUTE PAIN

## 2024-08-02 ASSESSMENT — SOCIAL DETERMINANTS OF HEALTH (SDOH)
WITHIN THE LAST YEAR, HAVE TO BEEN RAPED OR FORCED TO HAVE ANY KIND OF SEXUAL ACTIVITY BY YOUR PARTNER OR EX-PARTNER?: NO
WITHIN THE LAST YEAR, HAVE TO BEEN RAPED OR FORCED TO HAVE ANY KIND OF SEXUAL ACTIVITY BY YOUR PARTNER OR EX-PARTNER?: NO
WITHIN THE PAST 12 MONTHS, YOU WORRIED THAT YOUR FOOD WOULD RUN OUT BEFORE YOU GOT THE MONEY TO BUY MORE: PATIENT DECLINED
WITHIN THE LAST YEAR, HAVE YOU BEEN AFRAID OF YOUR PARTNER OR EX-PARTNER?: NO
WITHIN THE LAST YEAR, HAVE YOU BEEN HUMILIATED OR EMOTIONALLY ABUSED IN OTHER WAYS BY YOUR PARTNER OR EX-PARTNER?: NO
WITHIN THE LAST YEAR, HAVE YOU BEEN HUMILIATED OR EMOTIONALLY ABUSED IN OTHER WAYS BY YOUR PARTNER OR EX-PARTNER?: NO
WITHIN THE LAST YEAR, HAVE YOU BEEN KICKED, HIT, SLAPPED, OR OTHERWISE PHYSICALLY HURT BY YOUR PARTNER OR EX-PARTNER?: NO
WITHIN THE LAST YEAR, HAVE YOU BEEN AFRAID OF YOUR PARTNER OR EX-PARTNER?: NO
WITHIN THE PAST 12 MONTHS, THE FOOD YOU BOUGHT JUST DIDN'T LAST AND YOU DIDN'T HAVE MONEY TO GET MORE: PATIENT DECLINED
WITHIN THE LAST YEAR, HAVE YOU BEEN KICKED, HIT, SLAPPED, OR OTHERWISE PHYSICALLY HURT BY YOUR PARTNER OR EX-PARTNER?: NO
IN THE PAST 12 MONTHS, HAS THE ELECTRIC, GAS, OIL, OR WATER COMPANY THREATENED TO SHUT OFF SERVICE IN YOUR HOME?: PATIENT DECLINED

## 2024-08-02 ASSESSMENT — ENCOUNTER SYMPTOMS
HEADACHES: 1
DIARRHEA: 0
WEAKNESS: 0
BLURRED VISION: 0
NAUSEA: 0
CONSTIPATION: 0
PALPITATIONS: 0
HEARTBURN: 0
DIZZINESS: 0
CHILLS: 0
ABDOMINAL PAIN: 1
NAUSEA: 1
COUGH: 0
DEPRESSION: 0
VOMITING: 0
WHEEZING: 0
SORE THROAT: 0
SHORTNESS OF BREATH: 1
FEVER: 0
INSOMNIA: 0
PHOTOPHOBIA: 0
DIAPHORESIS: 0
NERVOUS/ANXIOUS: 0
EYE PAIN: 0

## 2024-08-02 ASSESSMENT — PATIENT HEALTH QUESTIONNAIRE - PHQ9
SUM OF ALL RESPONSES TO PHQ9 QUESTIONS 1 AND 2: 0
2. FEELING DOWN, DEPRESSED, IRRITABLE, OR HOPELESS: NOT AT ALL
1. LITTLE INTEREST OR PLEASURE IN DOING THINGS: NOT AT ALL

## 2024-08-02 ASSESSMENT — FIBROSIS 4 INDEX
FIB4 SCORE: 0.53
FIB4 SCORE: 0.59

## 2024-08-02 NOTE — ASSESSMENT & PLAN NOTE
Previously on fenofibrate but did not tolerate.Prediabetic based on recent A1c. Elevated cholesterol and low HDL on recent lipid panel.  -Will  on alcohol cessation

## 2024-08-02 NOTE — PROGRESS NOTES
4 Eyes Skin Assessment Completed by DARA Ramos and DARA Andrews.    Head WDL  Ears WDL  Nose WDL  Mouth WDL  Neck WDL  Breast/Chest WDL  Shoulder Blades WDL  Spine WDL  (R) Arm/Elbow/Hand WDL  (L) Arm/Elbow/Hand WDL  Abdomen WDL  Groin WDL  Scrotum/Coccyx/Buttocks WDL  (R) Leg WDL  (L) Leg WDL  (R) Heel/Foot/Toe WDL, swelling  (L) Heel/Foot/Toe WDL, swelling          Devices In Places Tele Box      Interventions In Place N/A    Possible Skin Injury No    Pictures Uploaded Into Epic N/A  Wound Consult Placed N/A  RN Wound Prevention Protocol Ordered No

## 2024-08-02 NOTE — ED NOTES
Handoff report to [facility] nursing staff. They will be down to pick the pt up shortly

## 2024-08-02 NOTE — HOSPITAL COURSE
Viktor Mesa is a 49 y.o. male who presented 8/1/2024 with shortness of breath.  Patient has a history of morbid obesity and chronic alcohol use.  He used to be a heavy drinker but now drinks about 3 times a week and would drink about 6-8 tequila shots per day.  For the last few weeks, patient has noted progressively worsening shortness of breath, chest pain, lower extremity swelling.  About a week ago he had an episode of gastroenteritis that is since then resolved.  Due to patient's symptoms and strong family history of cardiac disease, he made an appointment with cardiology outpatient and was scheduled for a stress test in the future.  However, patient noted to have worsening symptoms tonight.  His significant other, who is a medical assistant, checked his blood pressure and found him to be hypertensive with systolic blood pressure 180s and bradycardia with heart rate in the 50s, prompting her to bring patient to the ER for further evaluation.     In the ER, patient hypertensive.  Initial 2 troponins are negative.  Chest x-ray negative for acute cardiopulmonary abnormality.  EKG showing sinus bradycardia with no ischemic changes.  He was given 1 dose of IV Lasix.  Admitted for suspected new onset heart failure.

## 2024-08-02 NOTE — ASSESSMENT & PLAN NOTE
Notable history of heavy binge drinking 3-4 days a week. Now has complaints of abdominal pain. Concern for pancreatitis.  -Will check lipase.

## 2024-08-02 NOTE — H&P
Hospital Medicine History & Physical Note    Date of Service  8/1/2024    Primary Care Physician  Josh Walker P.A.-C.    Consultants  None    Code Status  Full Code    Chief Complaint  Chief Complaint   Patient presents with    Chest Pain     Pt states chest pain started yesterday with SOB. Pt had ecoli infection two weeks ago weighing 217lb, pt has gain 20lbs in two weeks with swelling of the ankles and abdomen.          History of Presenting Illness  Viktor Mesa is a 49 y.o. male who presented 8/1/2024 with shortness of breath.  Patient has a history of morbid obesity and chronic alcohol use.  He used to be a heavy drinker but now drinks about 3 times a week and would drink about 6-8 tequila shots per day.  For the last few weeks, patient has noted progressively worsening shortness of breath, chest pain, lower extremity swelling.  About a week ago he had an episode of gastroenteritis that is since then resolved.  Due to patient's symptoms and strong family history of cardiac disease, he made an appointment with cardiology outpatient and was scheduled for a stress test in the future.  However, patient noted to have worsening symptoms tonight.  His significant other, who is a medical assistant, checked his blood pressure and found him to be hypertensive with systolic blood pressure 180s and bradycardia with heart rate in the 50s, prompting her to bring patient to the ER for further evaluation.    In the ER, patient hypertensive.  Initial 2 troponins are negative.  Chest x-ray negative for acute cardiopulmonary abnormality.  EKG showing sinus bradycardia with no ischemic changes.  He was given 1 dose of IV Lasix.  Admitted for suspected new onset heart failure.    I discussed the plan of care with patient.    Review of Systems  Review of Systems   Constitutional: Negative.    HENT: Negative.     Eyes: Negative.    Respiratory:  Positive for shortness of breath.    Cardiovascular:  Positive for chest pain.    Gastrointestinal: Negative.    Genitourinary: Negative.    Musculoskeletal: Negative.    Skin: Negative.    Neurological: Negative.    Endo/Heme/Allergies: Negative.    Psychiatric/Behavioral: Negative.         Past Medical History   has a past medical history of Hypertension.    Surgical History   has a past surgical history that includes pr inj cerv/thorac,w/ imaging (Right, 7/8/2021); other orthopedic surgery (Right); and cervical disk and fusion anterior (9/22/2021).     Family History  family history is not on file.   Family history reviewed with patient. There is no family history that is pertinent to the chief complaint.     Social History   reports that he has been smoking cigarettes. He has a 2 pack-year smoking history. His smokeless tobacco use includes chew. He reports current alcohol use of about 4.8 oz of alcohol per week. He reports current drug use. Drugs: Oral and Cocaine.    Allergies  No Known Allergies    Medications  Prior to Admission Medications   Prescriptions Last Dose Informant Patient Reported? Taking?   albuterol 108 (90 Base) MCG/ACT Aero Soln inhalation aerosol   Yes No   Sig: Inhale 2 Puffs as needed for Shortness of Breath.   amoxicillin (AMOXIL) 500 MG Cap   No No   Sig: Take 4 capsules by mouth 1 hour prior to procedure.   losartan (COZAAR) 50 MG Tab   Yes No   Sig: Take 50 mg by mouth every day.   meloxicam (MOBIC) 15 MG tablet   No No   Sig: TAKE 1 TABLET BY MOUTH EVERY DAY FOR 30 DAYS.   omeprazole (PRILOSEC) 20 MG delayed-release capsule  Patient Yes No   Sig: Take 20 mg by mouth every morning.      Facility-Administered Medications: None       Physical Exam  Temp:  [36.9 °C (98.5 °F)] 36.9 °C (98.5 °F)  Pulse:  [56-69] 56  Resp:  [14-21] 20  BP: (140-175)/(64-96) 144/83  SpO2:  [93 %-95 %] 95 %  Blood Pressure: (!) 144/83   Temperature: 36.9 °C (98.5 °F)   Pulse: (!) 56   Respiration: 20   Pulse Oximetry: 95 %       Physical Exam  Constitutional:       Appearance: Normal  appearance. He is obese.   HENT:      Head: Normocephalic.      Nose: Nose normal.      Mouth/Throat:      Mouth: Mucous membranes are moist.   Eyes:      Pupils: Pupils are equal, round, and reactive to light.   Cardiovascular:      Rate and Rhythm: Normal rate and regular rhythm.      Pulses: Normal pulses.   Pulmonary:      Effort: Pulmonary effort is normal.      Breath sounds: Normal breath sounds.   Abdominal:      General: Abdomen is flat. Bowel sounds are normal.      Palpations: Abdomen is soft.   Musculoskeletal:      Cervical back: Neck supple.   Skin:     General: Skin is warm.   Neurological:      General: No focal deficit present.      Mental Status: He is alert and oriented to person, place, and time. Mental status is at baseline.   Psychiatric:         Mood and Affect: Mood normal.         Behavior: Behavior normal.         Thought Content: Thought content normal.         Judgment: Judgment normal.         Laboratory:  Recent Labs     08/01/24 1952   WBC 7.0   RBC 4.23*   HEMOGLOBIN 13.2*   HEMATOCRIT 38.7*   MCV 91.5   MCH 31.2   MCHC 34.1   RDW 42.0   PLATELETCT 282   MPV 10.1     Recent Labs     08/01/24 1952   SODIUM 142   POTASSIUM 3.8   CHLORIDE 108   CO2 23   GLUCOSE 108*   BUN 14   CREATININE 0.60   CALCIUM 9.2     Recent Labs     08/01/24 1952   ALTSGPT 35   ASTSGOT 20   ALKPHOSPHAT 50   TBILIRUBIN <0.2   GLUCOSE 108*         Recent Labs     08/01/24 1952   NTPROBNP 277*         Recent Labs     08/01/24 1952 08/01/24  2214   TROPONINT 9 10       Imaging:  DX-CHEST-PORTABLE (1 VIEW)   Final Result         1.  No acute cardiopulmonary disease.      EC-ECHOCARDIOGRAM COMPLETE W/O CONT    (Results Pending)   NM-CARDIAC STRESS TEST    (Results Pending)       EKG:  I have personally reviewed the images and compared with prior images.    Assessment/Plan:  Justification for Admission Status  I anticipate this patient will require at least two midnights for appropriate medical management,  necessitating inpatient admission because pt has suspected new onset heart failure    Patient will need a Telemetry bed on MEDICAL service .  The need is secondary to new suspected heart failure .    * Fluid overload- (present on admission)  Assessment & Plan  Noted to have progressively worsening shortness of breath, chest pain, lower extremity swelling.  Scheduled for stress test outpatient  Troponin x 2 negative with mildly elevated BNP  Start patient on Lasix 20 mg p.o. daily  Loaded with aspirin, nitroglycerin as needed  TTE  Stress test in a.m.    Morbid obesity (HCC)  Assessment & Plan  BMI 36    Hypertensive urgency  Assessment & Plan  Restart home medications  Hydralazine prn    Dyslipidemia- (present on admission)  Assessment & Plan  Previously on fenofibrate but did not tolerate        VTE prophylaxis: pharmacologic prophylaxis contraindicated due to stress test

## 2024-08-02 NOTE — ED TRIAGE NOTES
Viktor Mesa  49 y.o. male  Chief Complaint   Patient presents with    Chest Pain     Pt states chest pain started yesterday with SOB. Pt had ecoli infection two weeks ago weighing 217lb, pt has gain 20lbs in two weeks with swelling of the ankles and abdomen.        BIB self from home for above. Pt ambulated to triage. CP protocol ordered.     Pt is alert, oriented, and follows commands. Pt speaking in full sentences and responds appropriately to questions. No acute distress noted in triage. Respirations are even and unlabored.     Pt to phleb and educated on triage process. Pt encouraged to alert triage staff for any changes in condition. Pt signed up for messaging updates prior to leaving triage room.    Vitals:    08/01/24 1926   BP: (!) 175/96   Pulse: 69   Resp: 14   Temp: 36.9 °C (98.5 °F)   SpO2: 95%

## 2024-08-02 NOTE — CARE PLAN
The patient is Stable - Low risk of patient condition declining or worsening    Shift Goals  Clinical Goals: I&O, Telemtry, Echo, Stress test  Patient Goals: get some breakfast  Family Goals: abiodun    Progress made toward(s) clinical / shift goals:        Problem: Knowledge Deficit - Standard  Goal: Patient and family/care givers will demonstrate understanding of plan of care, disease process/condition, diagnostic tests and medications  Note: Assess knowledge level of disease process and answer all questions, explain tests, go over plan of care     Problem: Pain - Standard  Goal: Alleviation of pain or a reduction in pain to the patient’s comfort goal  Note: Give pain management medications as ordered, assess pain often, hourly rounding       Patient is not progressing towards the following goals:

## 2024-08-02 NOTE — ASSESSMENT & PLAN NOTE
Presented with worsening shortness of breath, chest pain, lower extremity swelling and weight gain. Neg Troponin x 2 with mildly elevated BNP (277). CXR showed evidence of cardiomegaly. Likely new onset acute decompensated heart failure. Ddx: ischemic vs recreational drugs vs myocarditis 2/2 virus.  -Increase Lasix to 40 mg IV  -Continue Losartan 50 mg once a day  -TTE to assess LV sys function  -F/u Stress test to r/o ischemia  -UDS  -Strict I/Os  -Fluid restrict 1.5L  -Daily weights

## 2024-08-02 NOTE — DISCHARGE PLANNING
Case Management Discharge Planning    Admission Date: 8/1/2024  GMLOS: 1.7  ALOS: 1    6-Clicks ADL Score: 24  6-Clicks Mobility Score: 24      Anticipated Discharge Dispo: Discharge Disposition: Discharged to home/self care (01)    DME Needed: No    Action(s) Taken: Updated Provider/Nurse on Discharge Plan Patient discussed during IDT rounds with medical team.    Escalations Completed: None    Medically Clear: No    Next Steps: Case Management will continue to follow for discharge planning needs.    Barriers to Discharge: Medical clearance    Is the patient up for discharge tomorrow: No    RN Case Manager met with patient and spouse at bedside and obtained the information used in this assessment. Patient verified accuracy of facesheet; patient lives in a single story home with his spouse.  Prior to current hospitalization, patient was independent with ADLS/IADLS. Patient drives and is able to attend necessary MD appointments. Patient's PCP is Josh Walker and prefers to use [facility]. Patient has no financial concerns. Patient has support from his spouse. Denies any history of substance use and denies any diagnosis of mental illness.    Care Transition Team Assessment    Information Source: Patient and spouse  Orientation Level: Oriented X4  Information Given By: Significant Other  Who is responsible for making decisions for patient? : Patient    Readmission Evaluation  Is this a readmission?: No    Elopement Risk  Legal Hold: No  Ambulatory or Self Mobile in Wheelchair: No-Not an Elopement Risk  Elopement Risk: Not at Risk for Elopement    Interdisciplinary Discharge Planning  Does Admitting Nurse Feel This Could be a Complex Discharge?: No  Primary Care Physician: Josh Walker  Lives with - Patient's Self Care Capacity: Spouse  Patient or legal guardian wants to designate a caregiver: Yes  Caregiver name: [facility]  Support Systems: None  Housing / Facility: [address]  Do You Take your Prescribed Medications  Regularly: Yes  Able to Return to Previous ADL's: Yes  Mobility Issues: No  Patient Prefers to be Discharged to:: Home  Assistance Needed: No    Discharge Preparedness  What is your plan after discharge?: Home with help  What are your discharge supports?: Spouse  Prior Functional Level: Ambulatory, Independent with Activities of Daily Living, Independent with Medication Management, Drives Self  Difficulity with ADLs: None  Difficulity with IADLs: None    Functional Assesment  Prior Functional Level: Ambulatory, Independent with Activities of Daily Living, Independent with Medication Management, Drives Self    Finances  Financial Barriers to Discharge: No  Prescription Coverage: Yes    Vision / Hearing Impairment  Vision Impairment : No  Hearing Impairment : No    Values / Beliefs / Concerns  Values / Beliefs Concerns : No    Advance Directive  Advance Directive?: None  Advance Directive offered?: AD Booklet refused    Domestic Abuse  Have you ever been the victim of abuse or violence?: No  Possible Abuse/Neglect Reported to:: Not Applicable    Psychological Assessment  History of Substance Abuse: Alcohol  History of Psychiatric Problems: No    Discharge Risks or Barriers  Discharge risks or barriers?: No    Anticipated Discharge Information  Discharge Disposition: Discharged to home/self care (01)

## 2024-08-02 NOTE — PROGRESS NOTES
"THIS IS A MEDICAL STUDENT NOTE FOR EDUCATIONAL PURPOSES ONLY    PROGRESS NOTE  Attending: Dr. Flakito Cooper  Senior Resident: Dr. Isaias Espinoza  Kevin Resident: Dr. German Sanchez   Medical Student: Kadie Laughlin, MS3  PATIENT: Viktor Mesa; [id number]; 1975    Hospital Day # Hospital Day: 2    ID:  Mr Mesa is a 49 year old male presenting with profuse swelling in his feet and ankles, chest tightness, occasional sharp chest pain, HTN and shortness of breath. His past medical history is significant for obesity, HTN, tobacco smoking and chews, cocaine use and chronic alcohol use. Family history significant for adverse cardiac event in father (54), quadruple bipass surgery in grandfather (still alive at 94), and fatal cardiac event in great grandfather (56).    HOSPITAL COURSE: Mr. Mesa presented 8/1 to the emergency room with shortness of breath. He said his legs started swelling on 7/30. He started having chest tightness, occasional sharp chest pain and shortness of breath. On 7/29 he drink eight beers, smoked three cigarettes and had two chews after work. The week prior he lost 10 pounds following a bout of \"E. Coli\" gastritis with nausea, vomiting and diarrhea. When he presented to the emergency room, he was 20 pounds heavier than he was 7/28. He had profuse abdominal tenderness, especially in the epigastric region. The ER found his initial two troponins to be negative and EKG showed normal sinus rhythm bradycardia with no ischemic changes. They gave him IV Lasix. CXR shows cardiomegaly and some pulmonary edema.     SUBJECTIVE:    INTERVAL: Hospitalist note 9795 8/2   - Cardio stress test ordered   - Echocardiogram ordered   - Lasix continued 40mg IV   - Lipase wnl   - Continue Losartan 50mg BID    ROS:  Review of Systems   Constitutional:  Negative for chills and diaphoresis.   Respiratory:  Positive for shortness of breath.    Cardiovascular:  Positive for chest pain and leg swelling. Negative for palpitations. " "  Gastrointestinal:  Positive for abdominal pain and nausea. Negative for vomiting.   Neurological:  Positive for headaches.        Vitals:    08/01/24 2300 08/02/24 0018 08/02/24 0543 08/02/24 0742   BP: (!) 144/83 (!) 151/88 139/71 139/80   Pulse: (!) 56 60 (!) 57 60   Resp: 20 20 20 20   Temp:  36.3 °C (97.3 °F) 36.2 °C (97.2 °F) 36.1 °C (97 °F)   TempSrc:  Temporal Temporal Temporal   SpO2: 95% 95% 94% 95%   Weight:  107 kg (235 lb 10.8 oz) 103 kg (226 lb 3.1 oz)    Height:   1.727 m (5' 7.99\")       Physical Exam  Constitutional:       General: He is not in acute distress.     Appearance: He is obese. He is not ill-appearing or diaphoretic.   HENT:      Head: Normocephalic and atraumatic.   Cardiovascular:      Rate and Rhythm: Normal rate and regular rhythm.      Heart sounds: Normal heart sounds. No murmur heard.     No friction rub. No gallop.   Pulmonary:      Effort: Pulmonary effort is normal. No respiratory distress.      Breath sounds: Normal breath sounds.   Abdominal:      General: There is no distension.      Palpations: Abdomen is soft.      Tenderness: There is abdominal tenderness.      Comments: Tenderness especially in epigastric region    Musculoskeletal:      Right lower leg: Edema present.      Left lower leg: Edema present.   Skin:     General: Skin is warm and dry.         LABS:  Recent Labs     08/01/24 1952 08/02/24 0048   WBC 7.0 7.3   RBC 4.23* 4.53*   HEMOGLOBIN 13.2* 13.9*   HEMATOCRIT 38.7* 41.0*   MCV 91.5 90.5   MCH 31.2 30.7   RDW 42.0 41.6   PLATELETCT 282 310   MPV 10.1 10.5   NEUTSPOLYS 48.60  --    LYMPHOCYTES 35.90  --    MONOCYTES 10.80  --    EOSINOPHILS 3.70  --    BASOPHILS 0.60  --        Recent Labs     08/01/24 1952 08/02/24 0048   SODIUM 142 139   POTASSIUM 3.8 3.8   CHLORIDE 108 102   CO2 23 22   GLUCOSE 108* 100*   BUN 14 13      Recent Labs     08/01/24 1952 08/02/24 0048   ALBUMIN 3.8  --    TBILIRUBIN <0.2  --    ALKPHOSPHAT 50  --    TOTPROTEIN 6.2  --  "   ALTSGPT 35  --    ASTSGOT 20  --    CREATININE 0.60 0.60      Lab Results   Component Value Date/Time    PROTHROMBTM 12.3 09/23/2021 01:32 AM    INR 0.94 09/23/2021 01:32 AM     Results       ** No results found for the last 168 hours. **             Intake/Output Summary (Last 24 hours) at 8/2/2024 1318  Last data filed at 8/2/2024 0800  Gross per 24 hour   Intake 600 ml   Output 4075 ml   Net -3475 ml        IMAGING:  DX-CHEST-PORTABLE (1 VIEW)   Final Result         1.  No acute cardiopulmonary disease.      EC-ECHOCARDIOGRAM COMPLETE W/O CONT    (Results Pending)   NM-CARDIAC STRESS TEST    (Results Pending)        ASSESSMENT AND PLAN:    *Fluid overload- (present on admission)  Assessment & Plan  Patient presented with progressively worsening chest tightness, occasional sharp chest pain, HTN, bradycardia, shortness of breath and 20 lb swelling of the lower extremities. The ER found his initial two troponins to be negative and EKG showed normal sinus rhythm bradycardia with no ischemic changes. Following IV Lasix in the emergency room, he has lost 10 lbs and is feeling better.   - CXR shows cardiomegaly and some pulmonary edema.   - Cardio stress test ordered  - Echocardiogram ordered  - Lasix continued 40mg IV  - Continue Losartan 50mg BID  - Monitor fluid I/O    Stomach pain  Assessment & Plan        Patient presents with diffuse abdominal pain, especially in the epigastric region following a week long      gastritis and then binge drinking.   - Lipase wnl    Hypertensive urgency  Assessment & Plan  Patient has HTN and a family history of adverse cardiac events.   - Continue Losartan 50mg BID  - Lasix continued 40mg IV  - Discourage use of cocaine      Dyslipidemia- (present on admission)  Assessment & Plan  Patient presents with elevated triglycerides.   - Previously on fenofibrate but did not tolerate      Morbid obesity (HCC)  Assessment & Plan  Patient has a BMI of 34.3.   - Encourage healthy lifestyle          VTE prophylaxis: pharmacologic prophylaxis currently contraindicated, awaiting stress test        Kadie Laughlin, MS3  Medical Student, [employer]

## 2024-08-02 NOTE — ED NOTES
Pharmacy Medication Reconciliation      ~Medication reconciliation updated and complete per patient & Spouse at bedside  ~Allergies have been verified and updated   ~~Patient home pharmacy :  [facility] [phone]      ~Anticoagulants (rivaroxaban, apixaban, edoxaban, dabigatran, warfarin, enoxaparin) taken in the last 14 days? No    Patient was to take a 3 day course of Azithromycin 500mg qd took only one pill on 7/24

## 2024-08-02 NOTE — CARE PLAN
The patient is Stable - Low risk of patient condition declining or worsening    Shift Goals  Clinical Goals: monitor Is and Os, tele monitoring  Patient Goals: rest, eat    Progress made toward(s) clinical / shift goals:    Problem: Knowledge Deficit - Standard  Goal: Patient and family/care givers will demonstrate understanding of plan of care, disease process/condition, diagnostic tests and medications  Description: Target End Date:  1-3 days or as soon as patient condition allows    Document in Patient Education    1.  Patient and family/caregiver oriented to unit, equipment, visitation policy and means for communicating concern  2.  Complete/review Learning Assessment  3.  Assess knowledge level of disease process/condition, treatment plan, diagnostic tests and medications  4.  Explain disease process/condition, treatment plan, diagnostic tests and medications  Outcome: Progressing  Note: Pt updated of plan of care at bedside shift report, pts questions and concerns are addressed. Pts barriers to discharge are addressed.      Problem: Care Map:  Day 1 Optimal Outcome for the Heart Failure Patient  Goal: Day 1:  Optimal Care of the heart failure patient  Description: Target End Date:  end of day 1  Outcome: Progressing

## 2024-08-02 NOTE — PROGRESS NOTES
Pt is alert and oriented x4, no complaints of pain, ambulatory and continent. Pt has wife at bedside. Pt is compliant but only after educating repeatedly and with detail. Example, pt will go to the bathroom three times and educated three times before finally agreeing to use urinal for strict I's and o's. Pt is hypertensive and bradycardic but otherwise stable. Call light in reach and educated on fall precautions.

## 2024-08-02 NOTE — PROGRESS NOTES
[facility] Internal Medicine Daily Progress Note    Date of Service  8/2/2024    [facility] Team: R IM Purple Team   Attending: MARYCARMEN Cooper M.d.  Senior Resident: Dr. Espinoza  Intern:  Dr. Sanchez  Contact Number: [phone]    Chief Complaint  Viktor Mesa is a 49 y.o. male admitted 8/1/2024 with SOB, leg swelling, chest pain.    Hospital Course  Viktor Mesa is a 49 y.o. male who presented 8/1/2024 with shortness of breath.  Patient has a history of morbid obesity and chronic alcohol use.  He used to be a heavy drinker but now drinks about 3 times a week and would drink about 6-8 tequila shots per day.  For the last few weeks, patient has noted progressively worsening shortness of breath, chest pain, lower extremity swelling.  About a week ago he had an episode of gastroenteritis that is since then resolved.  Due to patient's symptoms and strong family history of cardiac disease, he made an appointment with cardiology outpatient and was scheduled for a stress test in the future.  However, patient noted to have worsening symptoms tonight.  His significant other, who is a medical assistant, checked his blood pressure and found him to be hypertensive with systolic blood pressure 180s and bradycardia with heart rate in the 50s, prompting her to bring patient to the ER for further evaluation.     In the ER, patient hypertensive.  Initial 2 troponins are negative.  Chest x-ray negative for acute cardiopulmonary abnormality.  EKG showing sinus bradycardia with no ischemic changes.  He was given 1 dose of IV Lasix.  Admitted for suspected new onset heart failure.    Interval Problem Update  Patient admitted last night for fluid overload with notable edema on bilateral LE. BNP elevated at 277. Neg trops. EKG showed sinus jerome. A1c 5.8 and lipid panel demonstrated elevated triglycerides and low HDL. He was given one time IV Lasix 40 mg. Good urine output overnight. Chest pain improved. Lungs clear on exam. TTE and  stress test today.    Reports headache that he attributes to lack of food and dehydration. States it's bilateral and describes as pulsating. No history of migraines. Denies auras, auditory or visual hallucinations, tremors, or diaphoresis. Has abdominal pain but denies nausea or vomiting.    I have discussed this patient's plan of care and discharge plan at IDT rounds today with Case Management, Nursing, Nursing leadership, and other members of the IDT team.    Consultants/Specialty      Code Status  Full Code    Disposition  The patient is not medically cleared for discharge to home or a post-acute facility.      I have placed the appropriate orders for post-discharge needs.    Review of Systems  Review of Systems   Constitutional:  Negative for chills, diaphoresis, fever and malaise/fatigue.   HENT:  Negative for congestion, ear pain, hearing loss and sore throat.    Eyes:  Negative for blurred vision, photophobia and pain.   Respiratory:  Positive for shortness of breath. Negative for cough and wheezing.    Cardiovascular:  Positive for chest pain and leg swelling. Negative for palpitations.   Gastrointestinal:  Positive for abdominal pain. Negative for constipation, diarrhea, heartburn, nausea and vomiting.   Genitourinary:  Negative for dysuria and frequency.   Skin:  Negative for rash.   Neurological:  Positive for headaches. Negative for dizziness and weakness.   Psychiatric/Behavioral:  Negative for depression. The patient is not nervous/anxious and does not have insomnia.         Physical Exam  Temp:  [36.1 °C (97 °F)-36.9 °C (98.5 °F)] 36.1 °C (97 °F)  Pulse:  [56-69] 60  Resp:  [14-21] 20  BP: (139-175)/(64-96) 139/80  SpO2:  [93 %-95 %] 95 %    Physical Exam  Vitals and nursing note reviewed.   Constitutional:       General: He is not in acute distress.     Appearance: He is obese. He is not diaphoretic.   HENT:      Head: Normocephalic and atraumatic.   Eyes:      Extraocular Movements: Extraocular  movements intact.   Cardiovascular:      Rate and Rhythm: Normal rate and regular rhythm.      Pulses: Normal pulses.      Heart sounds: Normal heart sounds. No murmur heard.     No gallop.   Pulmonary:      Effort: Pulmonary effort is normal. No respiratory distress.      Breath sounds: Normal breath sounds. No wheezing, rhonchi or rales.   Abdominal:      General: There is no distension.      Palpations: Abdomen is soft.      Tenderness: There is abdominal tenderness (RUQ, LLQ).   Musculoskeletal:      Cervical back: Normal range of motion.      Right lower leg: Edema (+1 pitting edema up to the knee) present.      Left lower leg: Edema (trace) present.   Skin:     General: Skin is warm.      Coloration: Skin is not jaundiced.      Findings: No erythema or rash.   Neurological:      General: No focal deficit present.      Mental Status: He is alert and oriented to person, place, and time.   Psychiatric:         Mood and Affect: Mood normal.         Fluids    Intake/Output Summary (Last 24 hours) at 8/2/2024 1553  Last data filed at 8/2/2024 0800  Gross per 24 hour   Intake 600 ml   Output 4075 ml   Net -3475 ml       Laboratory  Recent Labs     08/01/24 1952 08/02/24  0048   WBC 7.0 7.3   RBC 4.23* 4.53*   HEMOGLOBIN 13.2* 13.9*   HEMATOCRIT 38.7* 41.0*   MCV 91.5 90.5   MCH 31.2 30.7   MCHC 34.1 33.9   RDW 42.0 41.6   PLATELETCT 282 310   MPV 10.1 10.5     Recent Labs     08/01/24 1952 08/02/24  0048   SODIUM 142 139   POTASSIUM 3.8 3.8   CHLORIDE 108 102   CO2 23 22   GLUCOSE 108* 100*   BUN 14 13   CREATININE 0.60 0.60   CALCIUM 9.2 9.2             Recent Labs     08/02/24  0048   TRIGLYCERIDE 297*   HDL 39*   LDL 85       Imaging  NM-CARDIAC STRESS TEST         DX-CHEST-PORTABLE (1 VIEW)   Final Result         1.  No acute cardiopulmonary disease.      EC-ECHOCARDIOGRAM COMPLETE W/O CONT    (Results Pending)        Assessment/Plan  Problem Representation:    * Fluid overload- (present on  admission)  Assessment & Plan  Presented with worsening shortness of breath, chest pain, lower extremity swelling and weight gain. Neg Troponin x 2 with mildly elevated BNP (277). CXR showed evidence of cardiomegaly. Likely new onset acute decompensated heart failure. Ddx: ischemic vs recreational drugs vs myocarditis 2/2 virus.  -Increase Lasix to 40 mg IV  -Continue Losartan 50 mg once a day  -TTE to assess LV sys function  -F/u Stress test to r/o ischemia  -UDS  -Strict I/Os  -Fluid restrict 1.5L  -Daily weights    Morbid obesity (HCC)  Assessment & Plan  BMI 36    Hypertensive urgency  Assessment & Plan  Blood pressure much improved.  -Continue home Losartan  -Hydralazine prn if >180 sys BP    Dyslipidemia- (present on admission)  Assessment & Plan  Previously on fenofibrate but did not tolerate.Prediabetic based on recent A1c. Elevated cholesterol and low HDL on recent lipid panel.  -Will  on alcohol cessation    Alcohol abuse, episodic- (present on admission)  Assessment & Plan  Notable history of heavy binge drinking 3-4 days a week. Now has complaints of abdominal pain. Concern for pancreatitis.  -Will check lipase.          VTE prophylaxis: enoxaparin ppx    I have performed a physical exam and reviewed and updated ROS and Plan today (8/2/2024). In review of yesterday's note (8/1/2024), there are no changes except as documented above.

## 2024-08-02 NOTE — ED PROVIDER NOTES
ED Provider Note    CHIEF COMPLAINT  Chief Complaint   Patient presents with    Chest Pain     Pt states chest pain started yesterday with SOB. Pt had ecoli infection two weeks ago weighing 217lb, pt has gain 20lbs in two weeks with swelling of the ankles and abdomen.          EXTERNAL RECORDS REVIEWED  Inpatient Notes office visit on 7/25/2024 for hyperlipidemia, family history of coronary artery disease     HPI/ROS  LIMITATION TO HISTORY   Select: : None  OUTSIDE HISTORIAN(S):    Viktor Mesa is a 49 y.o. male who presents with left-sided chest tightness which is nonradiating, nonexertional but is pleuritic.  Patient reports that the symptom started yesterday and he is also had shortness of breath.  Patient has recently recovered from an episode of gastroenteritis likely bacterial in etiology.  Patient also reports weight gain and swelling in the lower extremities.    PAST MEDICAL HISTORY   has a past medical history of Hypertension.    SURGICAL HISTORY   has a past surgical history that includes inj cerv/thorac,w/ imaging (Right, 7/8/2021); other orthopedic surgery (Right); and cervical disk and fusion anterior (9/22/2021).    FAMILY HISTORY  History reviewed. No pertinent family history.    SOCIAL HISTORY  Social History     Tobacco Use    Smoking status: Light Smoker     Current packs/day: 0.10     Average packs/day: 0.1 packs/day for 20.0 years (2.0 ttl pk-yrs)     Types: Cigarettes    Smokeless tobacco: Current     Types: Chew   Vaping Use    Vaping status: Former   Substance and Sexual Activity    Alcohol use: Yes     Alcohol/week: 4.8 oz     Types: 8 Cans of beer per week     Comment: 8 beers per week    Drug use: Yes     Types: Oral, Cocaine     Comment: Occ    Sexual activity: Yes     Partners: Female       CURRENT MEDICATIONS  Home Medications       Reviewed by Daksha Case R.N. (Registered Nurse) on 08/01/24 at 1947  Med List Status: Partial     Medication Last Dose Status   albuterol 108 (90  "Base) MCG/ACT Aero Soln inhalation aerosol  Active   amoxicillin (AMOXIL) 500 MG Cap  Active   losartan (COZAAR) 50 MG Tab  Active   meloxicam (MOBIC) 15 MG tablet  Active   omeprazole (PRILOSEC) 20 MG delayed-release capsule  Active                  Audit from Redirected Encounters    **Home medications have not yet been reviewed for this encounter**         ALLERGIES  No Known Allergies    PHYSICAL EXAM  VITAL SIGNS: BP (!) 144/83   Pulse (!) 56   Temp 36.9 °C (98.5 °F) (Temporal)   Resp 20   Ht 1.727 m (5' 8\")   Wt 108 kg (237 lb 10.5 oz)   SpO2 95%   BMI 36.14 kg/m²    Vitals and nursing note reviewed.   Constitutional:       Comments: Patient is lying in bed supine, pleasant, conversant, speaking in complete sentences   HENT:      Head: Normocephalic and atraumatic.   Eyes:      Extraocular Movements: Extraocular movements intact.      Conjunctiva/sclera: Conjunctivae normal.      Pupils: Pupils are equal, round, and reactive to light.   Cardiovascular:      Pulses: Normal pulses.      Comments: HR 69  Pulmonary:      Effort: Pulmonary effort is normal. No respiratory distress.   Scant bibasilar inspiratory rales  Abdominal:      Comments: Abdomen is soft, epigastric tenderness to palpation, non-distended, non-rigid, no rebound, guarding, masses, no McBurney's point tenderness, no peritoneal signs, negative Rovsing sign, negative Ridley sign.  No CVA tenderness to palpation. Benign abdomen.   Musculoskeletal:         General: No swelling. Normal range of motion.      Cervical back: Normal range of motion. No rigidity.   Skin:     General: Skin is warm and dry.      Capillary Refill: Capillary refill takes less than 2 seconds.   Neurological:      Mental Status: Alert.       EKG/LABS  No evidence of acute ischemia  I have independently interpreted this EKG    RADIOLOGY/PROCEDURES   I have independently interpreted the diagnostic imaging associated with this visit and am waiting the final reading from the " radiologist.   My preliminary interpretation is as follows: No pneumonia, pneumothorax, pulmonary edema    Radiologist interpretation:  DX-CHEST-PORTABLE (1 VIEW)   Final Result         1.  No acute cardiopulmonary disease.          COURSE & MEDICAL DECISION MAKING    ASSESSMENT, COURSE AND PLAN  Care Narrative: CBC to evaluate for acute anemia and leukocytosis.  CMP to evaluate for acute electrolyte abnormality, acute kidney injury, acute liver failure or dysfunction.  D-dimer to rule out PE.  Troponin to evaluate for ACS, EKG nonischemic.  Chest x-ray to evaluate for acute cardiopulmonary process such as pneumonia, pulmonary edema, pneumothorax.    Electronically signed by: Andrew Martínez M.D., 8/1/2024 8:15 PM    Elevated BNP, lower extremity swelling, inspiratory rales, dyspnea on exertion, patient presentation is concerning for new onset heart failure.  Patient will be given IV Lasix and will be admitted to hospital medicine service for echocardiogram and stress testing, especially given elevated heart score. CMP demonstrates no evidence of acute kidney injury, acute electrolyte abnormality, acute liver failure, CBC demonstrates no evidence of acute anemia or leukocytosis.  D-dimer negative, PE inconsistent with patient presentation at this time.    This dictation has been created using voice recognition software. I am continuously working with the software to minimize the number of voice recognition errors and I have made every attempt to manually correct the errors within my dictation. However errors  related to this voice recognition software may still exist and should be interpreted within the appropriate context.     Electronically signed by: Andrew Martínez M.D., 8/1/2024 11:35 PM      Heart score 4        FINAL DIAGNOSIS  1. Acute congestive heart failure, unspecified heart failure type (HCC)    2. Chest pain, unspecified type         Electronically signed by: Anrdew Martínez M.D.,  8/1/2024 8:14 PM

## 2024-08-03 VITALS
RESPIRATION RATE: 18 BRPM | BODY MASS INDEX: 32.14 KG/M2 | HEART RATE: 67 BPM | DIASTOLIC BLOOD PRESSURE: 52 MMHG | WEIGHT: 212.08 LBS | HEIGHT: 68 IN | SYSTOLIC BLOOD PRESSURE: 98 MMHG | TEMPERATURE: 96.8 F | OXYGEN SATURATION: 97 %

## 2024-08-03 LAB
ANION GAP SERPL CALC-SCNC: 16 MMOL/L (ref 7–16)
BUN SERPL-MCNC: 13 MG/DL (ref 8–22)
CALCIUM SERPL-MCNC: 9.3 MG/DL (ref 8.5–10.5)
CHLORIDE SERPL-SCNC: 99 MMOL/L (ref 96–112)
CO2 SERPL-SCNC: 23 MMOL/L (ref 20–33)
CREAT SERPL-MCNC: 0.67 MG/DL (ref 0.5–1.4)
GFR SERPLBLD CREATININE-BSD FMLA CKD-EPI: 114 ML/MIN/1.73 M 2
GLUCOSE SERPL-MCNC: 107 MG/DL (ref 65–99)
MAGNESIUM SERPL-MCNC: 2 MG/DL (ref 1.5–2.5)
POTASSIUM SERPL-SCNC: 3.6 MMOL/L (ref 3.6–5.5)
SODIUM SERPL-SCNC: 138 MMOL/L (ref 135–145)

## 2024-08-03 PROCEDURE — 700102 HCHG RX REV CODE 250 W/ 637 OVERRIDE(OP)

## 2024-08-03 PROCEDURE — A9270 NON-COVERED ITEM OR SERVICE: HCPCS

## 2024-08-03 PROCEDURE — A9270 NON-COVERED ITEM OR SERVICE: HCPCS | Mod: JZ

## 2024-08-03 PROCEDURE — 700102 HCHG RX REV CODE 250 W/ 637 OVERRIDE(OP): Mod: JZ

## 2024-08-03 PROCEDURE — 700111 HCHG RX REV CODE 636 W/ 250 OVERRIDE (IP): Mod: JZ

## 2024-08-03 PROCEDURE — 36415 COLL VENOUS BLD VENIPUNCTURE: CPT

## 2024-08-03 PROCEDURE — 83735 ASSAY OF MAGNESIUM: CPT

## 2024-08-03 PROCEDURE — 99238 HOSP IP/OBS DSCHRG MGMT 30/<: CPT | Mod: GC | Performed by: INTERNAL MEDICINE

## 2024-08-03 PROCEDURE — 80048 BASIC METABOLIC PNL TOTAL CA: CPT

## 2024-08-03 RX ORDER — POTASSIUM CHLORIDE 1500 MG/1
40 TABLET, EXTENDED RELEASE ORAL ONCE
Status: COMPLETED | OUTPATIENT
Start: 2024-08-03 | End: 2024-08-03

## 2024-08-03 RX ADMIN — FUROSEMIDE 40 MG: 10 INJECTION, SOLUTION INTRAVENOUS at 05:47

## 2024-08-03 RX ADMIN — POTASSIUM CHLORIDE 40 MEQ: 1500 TABLET, EXTENDED RELEASE ORAL at 06:34

## 2024-08-03 RX ADMIN — TIZANIDINE 4 MG: 4 TABLET ORAL at 05:47

## 2024-08-03 RX ADMIN — IBUPROFEN 600 MG: 600 TABLET, FILM COATED ORAL at 05:47

## 2024-08-03 RX ADMIN — OMEPRAZOLE 20 MG: 20 CAPSULE, DELAYED RELEASE ORAL at 05:46

## 2024-08-03 ASSESSMENT — ENCOUNTER SYMPTOMS
ABDOMINAL PAIN: 1
CONSTIPATION: 0
DIZZINESS: 0
COUGH: 0
ORTHOPNEA: 0
WHEEZING: 0
HEARTBURN: 1
DIARRHEA: 0
SHORTNESS OF BREATH: 0
HEADACHES: 0
VOMITING: 0

## 2024-08-03 ASSESSMENT — PAIN DESCRIPTION - PAIN TYPE: TYPE: ACUTE PAIN

## 2024-08-03 ASSESSMENT — FIBROSIS 4 INDEX: FIB4 SCORE: 0.53

## 2024-08-03 NOTE — DISCHARGE SUMMARY
[facility] Internal Medicine Discharge Summary    Attending: Louis Plasencia M.d.  Senior Resident: Dr. Espinoza  Intern:  Dr. Brewer  Contact Number: [phone]    CHIEF COMPLAINT ON ADMISSION  Chief Complaint   Patient presents with    Chest Pain     Pt states chest pain started yesterday with SOB. Pt had ecoli infection two weeks ago weighing 217lb, pt has gain 20lbs in two weeks with swelling of the ankles and abdomen.          Reason for Admission  chest pain     Admission Date  8/1/2024    CODE STATUS  Full Code    HPI & HOSPITAL COURSE  Viktor Mesa is a 49 y.o. male who presented 8/1/2024 with shortness of breath.  Patient has a history of morbid obesity and chronic alcohol use.  He used to be a heavy drinker but now drinks about 3 times a week and would drink about 6-8 tequila shots per day.  For the last few weeks, patient has noted progressively worsening shortness of breath, chest pain, lower extremity swelling.  About a week ago he had an episode of gastroenteritis that is since then resolved. He also had a COVID infection about 6 weeks ago. He also states he did receive an LR infusion for his viral gastroenteritis from a mobile infusion. In the ED he was found him to be hypertensive with systolic blood pressure 180s and bradycardia with heart rate in the 60s In the ER, patient hypertensive. Labs including cbc and cmp , two troponins are negative.  Chest x-ray negative for acute cardiopulmonary abnormality.  EKG showing sinus bradycardia with no ischemic changes.  He was admitted for concerns of new onset heart failure started on diuresis. He underwent a echocardiogram and nuclear stress test both of which were unremarkable. His LVEF was, no wall motion abnormality, no diastolic dysfunction.   On day of discharge he was doing well, on room air on ambulation. He was advised to monitor his weight, hold his BP medication monitor his blood pressure at this time and restart tomorrow.    Therefore, he is  "discharged in good and stable condition to home with close outpatient follow-up.    The patient met 2-midnight criteria for an inpatient stay at the time of discharge.    Discharge Date  08/03/2024    Physical Exam on Day of Discharge  Physical Exam  Constitutional:       Appearance: Normal appearance.   HENT:      Head: Normocephalic and atraumatic.   Eyes:      General:         Right eye: No discharge.         Left eye: No discharge.      Extraocular Movements: Extraocular movements intact.      Conjunctiva/sclera: Conjunctivae normal.      Pupils: Pupils are equal, round, and reactive to light.   Cardiovascular:      Rate and Rhythm: Normal rate and regular rhythm.      Pulses: Normal pulses.      Heart sounds: No murmur heard.  Pulmonary:      Effort: Pulmonary effort is normal. No respiratory distress.      Breath sounds: No wheezing.   Abdominal:      General: Abdomen is flat. Bowel sounds are normal.   Musculoskeletal:      Right lower leg: No edema.      Left lower leg: No edema.   Skin:     General: Skin is warm and dry.   Neurological:      General: No focal deficit present.      Mental Status: He is alert and oriented to person, place, and time. Mental status is at baseline.   Psychiatric:         Mood and Affect: Mood normal.         Behavior: Behavior normal.         FOLLOW UP ITEMS POST DISCHARGE  Primary Care    DISCHARGE DIAGNOSES  Principal Problem:    Fluid overload (POA: Yes)  Active Problems:    Alcohol abuse, episodic (POA: Yes)    Dyslipidemia (POA: Yes)      Overview: Chronic, uncontrolled.      Triglycerides 442 (10/2023).            Latest Labs: No results found for: \"CHOLSTRLTOT\", \"LDL\", \"HDL\",       \"TRIGLYCERIDE\"             Medications:  Tolerating/continue fenofibrate 145mg daily            Risk calculator: The ASCVD Risk score ([place] DK, et al., 2019) failed to       calculate.           Hypertensive urgency (POA: Unknown)    Morbid obesity (HCC) (POA: Unknown)  Resolved Problems:    " * No resolved hospital problems. *      FOLLOW UP  Future Appointments   Date Time Provider Department Center   8/12/2024  1:15 PM [facility] ECHO 1 RAVEN Brock   8/13/2024  1:30 PM [facility] NM FORTE 1 [facility]   11/1/2024  8:20 AM MARIKA Millard None     No follow-up provider specified.    MEDICATIONS ON DISCHARGE     Medication List        CONTINUE taking these medications        Instructions   albuterol 108 (90 Base) MCG/ACT Aers inhalation aerosol   Inhale 2 Puffs as needed for Shortness of Breath.  Dose: 2 Puff     CIALIS PO   Take 1 Tablet by mouth at bedtime.  Dose: 1 Tablet     ibuprofen 200 MG Tabs  Commonly known as: Motrin   Take 400 mg by mouth every 6 hours as needed for Mild Pain.  Dose: 400 mg     losartan 50 MG Tabs  Commonly known as: Cozaar   Take 50 mg by mouth every day.  Dose: 50 mg     omeprazole 20 MG delayed-release capsule  Commonly known as: PriLOSEC   Take 20 mg by mouth every morning.  Dose: 20 mg            STOP taking these medications      ALEVE PO     azithromycin 500 MG tablet  Commonly known as: Zithromax     diphenhydrAMINE 25 MG Tabs  Commonly known as: Benadryl     meloxicam 15 MG tablet  Commonly known as: Mobic              Allergies  No Known Allergies    DIET  Orders Placed This Encounter   Procedures    Diet Order Diet: Cardiac     Standing Status:   Standing     Number of Occurrences:   1     Order Specific Question:   Diet:     Answer:   Cardiac [6]       ACTIVITY  As tolerated.  Weight bearing as tolerated      LABORATORY  Lab Results   Component Value Date    SODIUM 138 08/03/2024    POTASSIUM 3.6 08/03/2024    CHLORIDE 99 08/03/2024    CO2 23 08/03/2024    GLUCOSE 107 (H) 08/03/2024    BUN 13 08/03/2024    CREATININE 0.67 08/03/2024        Lab Results   Component Value Date    WBC 7.3 08/02/2024    HEMOGLOBIN 13.9 (L) 08/02/2024    HEMATOCRIT 41.0 (L) 08/02/2024    PLATELETCT 310 08/02/2024        Total time of the discharge process exceeds 35 minutes.

## 2024-08-03 NOTE — PROGRESS NOTES
"THIS IS A MEDICAL STUDENT NOTE FOR EDUCATIONAL PURPOSES ONLY    PROGRESS NOTE  Attending: Dr. Louis Plasencia  Senior Resident: Dr. Isaias Espinoza  Kevin Resident: Dr. German Sanchez   Medical Student: Kadie Laughlin, MS3  PATIENT: Viktor Mesa; [id number]; 1975    Hospital Day # Hospital Day: 3    ID:  Mr Mesa is a 49 year old male presenting with profuse swelling in his feet and ankles, chest tightness, occasional sharp chest pain, HTN and shortness of breath. His past medical history is significant for obesity, HTN, tobacco smoking and chews, cocaine use and chronic alcohol use. Family history significant for adverse cardiac event in father (54), quadruple bipass surgery in grandfather (still alive at 94), and fatal cardiac event in great grandfather (56).     HOSPITAL COURSE: Mr. Mesa presented 8/1 to the emergency room with shortness of breath. He said his legs started swelling on 7/30. He started having chest tightness, occasional sharp chest pain and shortness of breath. On 7/29 he drink eight beers, smoked three cigarettes and had two chews after work. The week prior he lost 10 pounds following a bout of \"E. Coli\" gastritis with nausea, vomiting and diarrhea. When he presented to the emergency room, he was 20 pounds heavier than he was 7/28. He had profuse abdominal tenderness, especially in the epigastric region. Lipase was found to be wnl. The ER found his initial two troponins to be negative and EKG showed normal sinus rhythm bradycardia with no ischemic changes. They gave him IV Lasix. CXR shows cardiomegaly and some pulmonary edema. Cardio stress test and echo were unremarkable. Urine toxicology screen was negative. On day 3, patient had no pitting edema, was back to his baseline weight and had no complaints relating to breathing or chest pain. He was discharged.      SUBJECTIVE: Patient says he is doing better today and is not experiencing any more chest tightness, pain, or SOB. He said his stomach still hurt " "but not as much as yesterday. He said his legs looked normal again and he was ready to go home. His girlfriend also brought up that following his gastritis, they had a home health worker come give him a bag of LR to help rehydrate him, a potential cause of fluid overload.     INTERVAL: Prior note 1317 8/2/2024               - Cardio stress test unremarkable              - Echocardiogram unremarkable    - LVEF: 75   - MELD/Na score <2% 90 day mortality, liver and kidney labs normal              - Lasix continued 40mg IV         - Continue Losartan 50mg BID   - Urine toxicology screen negative   - Normal oxygen saturations when patient walked around this morning with nurse   - Patient discharged      ROS:  Review of Systems   Respiratory:  Negative for cough, shortness of breath and wheezing.    Cardiovascular:  Negative for chest pain, orthopnea and leg swelling.   Gastrointestinal:  Positive for abdominal pain and heartburn. Negative for constipation, diarrhea and vomiting.        Said epigastric pain not as bad as yesterday, being worked up outside for GERD   Neurological:  Negative for dizziness and headaches.        Vitals:    08/02/24 1936 08/03/24 0000 08/03/24 0547 08/03/24 0811   BP: 135/85 114/65 118/72 98/52   Pulse: 71 65 71 67   Resp: 18 18 18 18   Temp: 36.4 °C (97.6 °F) 36.4 °C (97.5 °F) 36.2 °C (97.2 °F) 36 °C (96.8 °F)   TempSrc: Temporal Temporal Temporal Temporal   SpO2: 92% 94% 92% 97%   Weight:   96.2 kg (212 lb 1.3 oz)    Height:   1.727 m (5' 7.99\")       Physical Exam  Constitutional:       General: He is not in acute distress.     Appearance: Normal appearance. He is obese. He is not ill-appearing or diaphoretic.   HENT:      Head: Normocephalic and atraumatic.   Cardiovascular:      Rate and Rhythm: Normal rate and regular rhythm.      Heart sounds: No murmur heard.     No friction rub. No gallop.   Pulmonary:      Effort: Pulmonary effort is normal. No respiratory distress.      Breath " sounds: Normal breath sounds. No wheezing, rhonchi or rales.   Musculoskeletal:         General: No swelling.      Right lower leg: No edema.      Left lower leg: No edema.   Skin:     General: Skin is warm and dry.   Neurological:      Mental Status: He is alert.   Psychiatric:         Mood and Affect: Mood normal.         Behavior: Behavior normal.         LABS:  Recent Labs     08/01/24 1952 08/02/24 0048   WBC 7.0 7.3   RBC 4.23* 4.53*   HEMOGLOBIN 13.2* 13.9*   HEMATOCRIT 38.7* 41.0*   MCV 91.5 90.5   MCH 31.2 30.7   RDW 42.0 41.6   PLATELETCT 282 310   MPV 10.1 10.5   NEUTSPOLYS 48.60  --    LYMPHOCYTES 35.90  --    MONOCYTES 10.80  --    EOSINOPHILS 3.70  --    BASOPHILS 0.60  --        Recent Labs     08/01/24 1952 08/02/24 0048 08/03/24  0002   SODIUM 142 139 138   POTASSIUM 3.8 3.8 3.6   CHLORIDE 108 102 99   CO2 23 22 23   GLUCOSE 108* 100* 107*   BUN 14 13 13      Recent Labs     08/01/24 1952 08/02/24 0048 08/03/24  0002   ALBUMIN 3.8  --   --    TBILIRUBIN <0.2  --   --    ALKPHOSPHAT 50  --   --    TOTPROTEIN 6.2  --   --    ALTSGPT 35  --   --    ASTSGOT 20  --   --    CREATININE 0.60 0.60 0.67      Lab Results   Component Value Date/Time    PROTHROMBTM 12.3 09/23/2021 01:32 AM    INR 0.94 09/23/2021 01:32 AM     Results       ** No results found for the last 168 hours. **             Intake/Output Summary (Last 24 hours) at 8/3/2024 1611  Last data filed at 8/3/2024 0800  Gross per 24 hour   Intake 500 ml   Output 980 ml   Net -480 ml        IMAGING:  EC-ECHOCARDIOGRAM COMPLETE W/ CONT   Final Result      NM-CARDIAC STRESS TEST   Final Result      DX-CHEST-PORTABLE (1 VIEW)   Final Result         1.  No acute cardiopulmonary disease.           ASSESSMENT AND PLAN:    *Fluid overload- (present on admission)  Assessment & Plan  Patient presented with progressively worsening chest tightness, occasional sharp chest pain, HTN, bradycardia, shortness of breath and 20 lb swelling of the lower  extremities. The ER found his initial two troponins to be negative and EKG showed normal sinus rhythm bradycardia with no ischemic changes. Following IV Lasix in the emergency room and in the hospital, he has returned to his base weight.  - CXR shows cardiomegaly and some pulmonary edema.   - Cardio stress test unremarkable  - Echocardiogram unremarkable  - LVEF 75  - Liver enzymes and albumin wnl, normal liver margins on percussion  - Kidney function lab values wnl  - MELD/Na score <2% 90 day mortality  - Lasix continued 40mg IV  - Continue Losartan 50mg BID  - Monitor fluid I/O     Stomach pain  Assessment & Plan        Patient presents with diffuse abdominal pain, especially in the epigastric region following a week long      gastritis and then binge drinking.   - Lipase 74, wnl  - Outpatient follow up for GERD     Hypertensive urgency  Assessment & Plan  Patient has HTN and a family history of adverse cardiac events.   - Continue Losartan 50mg BID  - Lasix continued 40mg IV  - Discourage use of cocaine      Dyslipidemia- (present on admission)  Assessment & Plan  Patient presents with elevated triglycerides.   - Previously on fenofibrate but did not tolerate      Morbid obesity (HCC)  Assessment & Plan  Patient has a BMI of 34.3.         - Encourage healthy lifestyle           VTE prophylaxis: pharmacologic prophylaxis currently contraindicated, awaiting stress test        Kadie Laughlin, MS3  Medical Student, [employer]

## 2024-08-03 NOTE — PROGRESS NOTES
Patient ambulated the unit. He maintained his HR and O2 sats consistently throughout the entire ambulation. Patient denied dizziness or lightheadedness, spoke during the walk, steady gait. HR maintained in the 60's and O2 sat was steady at 90-92%.

## 2024-08-03 NOTE — PROGRESS NOTES
Telemetry Report:      Rhythm:     SR  Heart Rate: 59-73  Ectopy:      None       VT:  0.15          QRS:       0.08  QT:   0.38        Per Telestrip from Monitor Room

## 2024-08-03 NOTE — CARE PLAN
The patient is Stable - Low risk of patient condition declining or worsening    Shift Goals  Clinical Goals: Monitor vital signs and labs  Patient Goals: rest  Family Goals: ANTONIA    Progress made toward(s) clinical / shift goals:        Problem: Pain - Standard  Goal: Alleviation of pain or a reduction in pain to the patient’s comfort goal  Outcome: Progressing  Note: Patient complaints oh headache, Ibuprofen PRN medication given. Effective. Able to sleep at night. No further complaints made      Problem: Fall Risk  Goal: Patient will remain free from falls  Outcome: Progressing  Note: Patient remained free from falls. All fall precautions in place. Patient educated the need to call when needed assistance, patient verbalized understanding. Call light and personal belongings within reach.         Patient is not progressing towards the following goals:

## 2024-08-09 NOTE — PROGRESS NOTES
Chief Complaint   Patient presents with    Hypertension    Coronary Artery Disease     F/V dx: Family history of coronary artery disease       Dyslipidemia       Subjective     Brenton Mesa is a 49 y.o. male who presents today for hospital follow up     He is followed by Dr. Ortega in our clinic, history of hypertension, dyslipidemia, family history of coronary artery disease, occasional cocaine use, intermittent binge drinking, ongoing tobacco exposure consisting of chewing tobacco and smoking.    Hospitalized 8/1/2024 for SOB. He underwent a echocardiogram and nuclear stress test both of which were unremarkable. His LVEF was, no wall motion abnormality, no diastolic dysfunction. Noted to be hypertensive and fluid overload.     Patient is concern regarding his family history of premature CAD. He will like further testing to evaluate his coronary arteries. Denies any chest pain, sob, dizziness or palpitations.     Past Medical History:   Diagnosis Date    Hypertension      Past Surgical History:   Procedure Laterality Date    CERVICAL DISK AND FUSION ANTERIOR  9/22/2021    Procedure: DISCECTOMY, SPINE, CERVICAL, ANTERIOR APPROACH, WITH FUSION - C5-6, C6-7 DECOMPRESSION AND PLACEMENT OF A PRESTIGE ARTIFICAL DISC PROSTHESIS;  Surgeon: Kay Rolle M.D.;  Location: SURGERY [facility];  Service: Orthopedics    SD INJ CERV/THORAC,W/ IMAGING Right 7/8/2021    Procedure: Cervical epidural;  Surgeon: Cesario Sage M.D.;  Location: [facility];  Service: Pain Management    OTHER ORTHOPEDIC SURGERY Right     shoulder      History reviewed. No pertinent family history.  Social History     Socioeconomic History    Marital status: Single     Spouse name: Not on file    Number of children: Not on file    Years of education: Not on file    Highest education level: Not on file   Occupational History    Not on file   Tobacco Use    Smoking status: Light Smoker     Current packs/day: 0.10     Average  packs/day: 0.1 packs/day for 20.0 years (2.0 ttl pk-yrs)     Types: Cigarettes    Smokeless tobacco: Current     Types: Chew   Vaping Use    Vaping status: Former   Substance and Sexual Activity    Alcohol use: Yes     Alcohol/week: 4.8 oz     Types: 8 Cans of beer per week     Comment: 8 beers per week    Drug use: Yes     Types: Oral, Cocaine     Comment: Occ    Sexual activity: Yes     Partners: Female   Other Topics Concern    Not on file   Social History Narrative    Not on file     Social Determinants of Health     Financial Resource Strain: Not on file   Food Insecurity: Patient Declined (8/2/2024)    Hunger Vital Sign     Worried About Running Out of Food in the Last Year: Patient declined     Ran Out of Food in the Last Year: Patient declined   Transportation Needs: No Transportation Needs (8/2/2024)    PRAPARE - Transportation     Lack of Transportation (Medical): No     Lack of Transportation (Non-Medical): No   Physical Activity: Not on file   Stress: Not on file   Social Connections: Not on file   Intimate Partner Violence: Not At Risk (8/2/2024)    Humiliation, Afraid, Rape, and Kick questionnaire     Fear of Current or Ex-Partner: No     Emotionally Abused: No     Physically Abused: No     Sexually Abused: No   Housing Stability: Low Risk  (8/2/2024)    Housing Stability Vital Sign     Unable to Pay for Housing in the Last Year: No     Number of Places Lived in the Last Year: 1     Unstable Housing in the Last Year: No     No Known Allergies  Outpatient Encounter Medications as of 8/14/2024   Medication Sig Dispense Refill    meloxicam (MOBIC) 15 MG tablet TAKE 1 TABLET BY MOUTH EVERY DAY FOR 30 DAYS. 30 Tablet 0    ibuprofen (MOTRIN) 200 MG Tab Take 400 mg by mouth every 6 hours as needed for Mild Pain.      Tadalafil (CIALIS PO) Take 1 Tablet by mouth at bedtime.      losartan (COZAAR) 50 MG Tab Take 50 mg by mouth every day.      albuterol 108 (90 Base) MCG/ACT Aero Soln inhalation aerosol Inhale 2  "Puffs as needed for Shortness of Breath.      omeprazole (PRILOSEC) 20 MG delayed-release capsule Take 20 mg by mouth every morning.       No facility-administered encounter medications on file as of 8/14/2024.     Review of Systems   Constitutional:  Negative for malaise/fatigue.   Eyes:  Negative for blurred vision and double vision.   Respiratory:  Negative for cough, shortness of breath and wheezing.    Cardiovascular:  Negative for chest pain, palpitations, orthopnea and leg swelling.   Musculoskeletal:  Negative for falls.   Neurological:  Negative for dizziness, focal weakness, loss of consciousness, weakness and headaches.   All other systems reviewed and are negative.             Objective     /80 (BP Location: Left arm, Patient Position: Sitting, BP Cuff Size: Adult)   Pulse 70   Resp 16   Ht 1.727 m (5' 8\")   Wt 106 kg (233 lb)   SpO2 94%   BMI 35.43 kg/m²     Physical Exam  Constitutional:       General: He is not in acute distress.     Appearance: He is well-developed. He is not diaphoretic.   HENT:      Head: Normocephalic and atraumatic.   Eyes:      Pupils: Pupils are equal, round, and reactive to light.   Neck:      Vascular: No JVD.   Cardiovascular:      Rate and Rhythm: Normal rate and regular rhythm.      Heart sounds: Normal heart sounds.   Pulmonary:      Effort: Pulmonary effort is normal.      Breath sounds: Normal breath sounds.   Abdominal:      General: Bowel sounds are normal. There is no distension.      Palpations: Abdomen is soft.   Musculoskeletal:         General: No edema.   Skin:     General: Skin is warm and dry.   Neurological:      Mental Status: He is alert and oriented to person, place, and time.   Psychiatric:         Mood and Affect: Mood and affect normal.         Behavior: Behavior normal.         Thought Content: Thought content normal.         Judgment: Judgment normal.            ECHO  8/2/2024  The ejection fraction is measured to be  75% by Khan's " biplane.  Normal right ventricular size and systolic function.  No significant valvular disease.       Myocardial Perfusion  8/2/2024   NUCLEAR IMAGING INTERPRETATION   No evidence of significant jeopardized viable myocardium or prior myocardial    infarction.   Normal left ventricular size, ejection fraction, and wall motion.   ECG INTERPRETATION   Nondiagnostic stress ECG with Regadenoson.      Lab Results   Component Value Date/Time    CHOLSTRLTOT 183 08/02/2024 12:48 AM    LDL 85 08/02/2024 12:48 AM    HDL 39 (A) 08/02/2024 12:48 AM    TRIGLYCERIDE 297 (H) 08/02/2024 12:48 AM       Lab Results   Component Value Date/Time    SODIUM 138 08/03/2024 12:02 AM    POTASSIUM 3.6 08/03/2024 12:02 AM    CHLORIDE 99 08/03/2024 12:02 AM    CO2 23 08/03/2024 12:02 AM    GLUCOSE 107 (H) 08/03/2024 12:02 AM    BUN 13 08/03/2024 12:02 AM    CREATININE 0.67 08/03/2024 12:02 AM     Lab Results   Component Value Date/Time    ALKPHOSPHAT 50 08/01/2024 07:52 PM    ASTSGOT 20 08/01/2024 07:52 PM    ALTSGPT 35 08/01/2024 07:52 PM    TBILIRUBIN <0.2 08/01/2024 07:52 PM          Assessment & Plan     1. Family history of coronary artery disease  CT-CTA HEART W/3D IMAGE      2. Dyslipidemia  CT-CTA HEART W/3D IMAGE          Medical Decision Making: Today's Assessment/Status/Plan:        Family  history of CAD   Hypertriglyceridemia   - we will get coronary ct with calcium score to evaluate statin therapy   - recommend he continues his fenofibrate for his triglycerides   - ECHO showed ef 75%    Follow up pending coronary CTA, sooner as needed.

## 2024-08-12 ENCOUNTER — HOSPITAL ENCOUNTER (OUTPATIENT)
Dept: CARDIOLOGY | Facility: MEDICAL CENTER | Age: 49
End: 2024-08-12
Attending: INTERNAL MEDICINE
Payer: COMMERCIAL

## 2024-08-13 ENCOUNTER — APPOINTMENT (OUTPATIENT)
Dept: RADIOLOGY | Facility: MEDICAL CENTER | Age: 49
End: 2024-08-13
Attending: INTERNAL MEDICINE
Payer: COMMERCIAL

## 2024-08-14 ENCOUNTER — OFFICE VISIT (OUTPATIENT)
Dept: CARDIOLOGY | Facility: MEDICAL CENTER | Age: 49
End: 2024-08-14
Attending: NURSE PRACTITIONER
Payer: COMMERCIAL

## 2024-08-14 VITALS
RESPIRATION RATE: 16 BRPM | SYSTOLIC BLOOD PRESSURE: 122 MMHG | WEIGHT: 233 LBS | DIASTOLIC BLOOD PRESSURE: 80 MMHG | HEART RATE: 70 BPM | BODY MASS INDEX: 35.31 KG/M2 | HEIGHT: 68 IN | OXYGEN SATURATION: 94 %

## 2024-08-14 DIAGNOSIS — Z82.49 FAMILY HISTORY OF CORONARY ARTERY DISEASE: ICD-10-CM

## 2024-08-14 DIAGNOSIS — E78.5 DYSLIPIDEMIA: ICD-10-CM

## 2024-08-14 PROCEDURE — 3079F DIAST BP 80-89 MM HG: CPT | Performed by: NURSE PRACTITIONER

## 2024-08-14 PROCEDURE — 99212 OFFICE O/P EST SF 10 MIN: CPT | Performed by: NURSE PRACTITIONER

## 2024-08-14 PROCEDURE — 3074F SYST BP LT 130 MM HG: CPT | Performed by: NURSE PRACTITIONER

## 2024-08-14 PROCEDURE — 99214 OFFICE O/P EST MOD 30 MIN: CPT | Performed by: NURSE PRACTITIONER

## 2024-08-14 ASSESSMENT — ENCOUNTER SYMPTOMS
FOCAL WEAKNESS: 0
COUGH: 0
DIZZINESS: 0
DOUBLE VISION: 0
PALPITATIONS: 0
SHORTNESS OF BREATH: 0
LOSS OF CONSCIOUSNESS: 0
WEAKNESS: 0
HEADACHES: 0
WHEEZING: 0
ORTHOPNEA: 0
BLURRED VISION: 0
FALLS: 0

## 2024-08-14 ASSESSMENT — FIBROSIS 4 INDEX: FIB4 SCORE: 0.53

## 2024-09-03 NOTE — PROGRESS NOTES
Request received to review order/ protocol for coronary CTA. Scan approved for [employer] scanner. Upon review of available medical records, no additional orders have been placed.    Do not take tadalafil for 7 days prior to scan and for one day after due to potential for life threatening medication interaction with NTG given during scan. Then resume.     This CT study may be scheduled through [facility] Imaging Scheduling at , option 2.

## 2024-11-01 ENCOUNTER — APPOINTMENT (OUTPATIENT)
Dept: CARDIOLOGY | Facility: MEDICAL CENTER | Age: 49
End: 2024-11-01
Attending: INTERNAL MEDICINE
Payer: COMMERCIAL

## 2024-11-01 ENCOUNTER — TELEPHONE (OUTPATIENT)
Dept: CARDIOLOGY | Facility: MEDICAL CENTER | Age: 49
End: 2024-11-01
Payer: COMMERCIAL

## 2025-01-15 ENCOUNTER — HOSPITAL ENCOUNTER (OUTPATIENT)
Dept: RADIOLOGY | Facility: MEDICAL CENTER | Age: 50
End: 2025-01-15
Attending: NURSE PRACTITIONER
Payer: COMMERCIAL

## 2025-01-15 VITALS — SYSTOLIC BLOOD PRESSURE: 114 MMHG | DIASTOLIC BLOOD PRESSURE: 70 MMHG | HEART RATE: 63 BPM

## 2025-01-15 DIAGNOSIS — E78.5 DYSLIPIDEMIA: ICD-10-CM

## 2025-01-15 DIAGNOSIS — Z82.49 FAMILY HISTORY OF CORONARY ARTERY DISEASE: ICD-10-CM

## 2025-01-15 PROCEDURE — 700117 HCHG RX CONTRAST REV CODE 255: Performed by: NURSE PRACTITIONER

## 2025-01-15 PROCEDURE — 75574 CT ANGIO HRT W/3D IMAGE: CPT

## 2025-01-15 PROCEDURE — 700102 HCHG RX REV CODE 250 W/ 637 OVERRIDE(OP)

## 2025-01-15 PROCEDURE — A9270 NON-COVERED ITEM OR SERVICE: HCPCS

## 2025-01-15 RX ORDER — NITROGLYCERIN 0.4 MG/1
TABLET SUBLINGUAL
Status: COMPLETED
Start: 2025-01-15 | End: 2025-01-15

## 2025-01-15 RX ORDER — NITROGLYCERIN 0.4 MG/1
0.4 TABLET SUBLINGUAL ONCE
Status: COMPLETED | OUTPATIENT
Start: 2025-01-15 | End: 2025-01-15

## 2025-01-15 RX ADMIN — NITROGLYCERIN 0.4 MG: 0.4 TABLET SUBLINGUAL at 08:17

## 2025-01-15 RX ADMIN — IOHEXOL 75 ML: 350 INJECTION, SOLUTION INTRAVENOUS at 08:45

## 2025-01-15 NOTE — PROGRESS NOTES
Patient had CCTA.  Patient brought to preparation room.   Verbal consent given by patient for PIV and administration of Nitroglycerin by RN.  PIV initiated, 20 LAC    Review of medications, protocol, and NPO status completed.   Education provided to patient regarding examination.  Patient given baseline 3 lead EKG:   Vitals: 0738  BP: 128/72  HR: 50  RR: 16   95% RA     Patient ready for CT scan  Vitals: 0808  BP: 143/85  HR: 67  RR: 16  96% RA    Administration of Sublingual Nitroglycerin given per CCTA protocol at 0817--See MAR  Vitals: 0817  BP: 114/76  HR: 63  RR: 16  98% RA    Post Nitro Vitals: 0822  BP: 96/56  HR: 74  RR: 16  96% RA    END Vitals: 0825  BP: 105/53  HR: 75  RR: 16  92% RA    Patient returned to preparation area where post procedure education given. PIV removed, patient provided with water.     Vitals returned to baseline. Patient states they are ready to go home. Discharge education provided. Discharged per protocol.     Patient is ambulatory, escorted by RN to [facility].

## 2025-08-05 ENCOUNTER — APPOINTMENT (OUTPATIENT)
Dept: RADIOLOGY | Facility: MEDICAL CENTER | Age: 50
End: 2025-08-05
Attending: PHYSICIAN ASSISTANT
Payer: COMMERCIAL

## 2025-08-18 ENCOUNTER — HOSPITAL ENCOUNTER (OUTPATIENT)
Dept: RADIOLOGY | Facility: MEDICAL CENTER | Age: 50
End: 2025-08-18
Attending: PHYSICIAN ASSISTANT
Payer: COMMERCIAL

## 2025-08-18 DIAGNOSIS — R13.12 OROPHARYNGEAL DYSPHAGIA: ICD-10-CM

## 2025-08-18 DIAGNOSIS — K21.9 LARYNGOPHARYNGEAL REFLUX: ICD-10-CM

## 2025-08-18 PROCEDURE — 74220 X-RAY XM ESOPHAGUS 1CNTRST: CPT

## (undated) DEVICE — SPONGE GAUZESTER 4 X 4 4PLY - (128PK/CA)

## (undated) DEVICE — SENSOR SPO2 NEO LNCS ADHESIVE (20/BX) SEE USER NOTES

## (undated) DEVICE — SHEET PEDIATRIC LAPAROTOMY - (10/CA)

## (undated) DEVICE — MIDAS LUBRICATOR DIFFUSER PACK (4EA/CA)

## (undated) DEVICE — BLADE SURGICAL CLIPPER - (50EA/CA)

## (undated) DEVICE — RESTRAINTS LIMB DISP. - (12/BX 4BX/CA)

## (undated) DEVICE — GLOVE BIOGEL SZ 8 SURGICAL PF LTX - (50PR/BX 4BX/CA)

## (undated) DEVICE — CHLORAPREP 26 ML APPLICATOR - ORANGE TINT(25/CA)

## (undated) DEVICE — TUBING C&T SET FLYING LEADS DRAIN TUBING (10EA/BX)

## (undated) DEVICE — COVER MAYO STAND X-LG - (22EA/CA)

## (undated) DEVICE — DISSECT TOOL MIDAS REX

## (undated) DEVICE — NEEDLE SPINAL NON-SAFETY 18 GA X 3 IN (25EA/BX)

## (undated) DEVICE — MASK ANESTHESIA ADULT  - (100/CA)

## (undated) DEVICE — INTRAOP NEURO IN OR 1:1 PER 15 MIN

## (undated) DEVICE — GLOVE SIZE 7.0 SURGEON ACCELERATOR FREE GREEN (50PR/BX 4BX/CA)

## (undated) DEVICE — BOVIE BLADE COATED &INSULATED - 25/PK

## (undated) DEVICE — CANISTER SUCTION 3000ML MECHANICAL FILTER AUTO SHUTOFF MEDI-VAC NONSTERILE LF DISP  (40EA/CA)

## (undated) DEVICE — ELECTRODE DUAL RETURN W/ CORD - (50/PK)

## (undated) DEVICE — TUBE E-T HI-LO CUFF 7.5MM (10EA/PK)

## (undated) DEVICE — PACK NEURO - (2EA/CA)

## (undated) DEVICE — KIT SURGIFLO W/OUT THROMBIN - (6EA/CA)

## (undated) DEVICE — KIT ANESTHESIA W/CIRCUIT & 3/LT BAG W/FILTER (20EA/CA)

## (undated) DEVICE — TOOL DISSECT MATCH HEAD

## (undated) DEVICE — GOWN WARMING STANDARD FLEX - (30/CA)

## (undated) DEVICE — SUTURE 2-0 VICRYL PLUS CT-1 36 (36PK/BX)"

## (undated) DEVICE — CLIP SM INTNL HRZN TI ESCP LGT - (24EA/PK 25PK/BX)

## (undated) DEVICE — PROTECTOR ULNA NERVE - (36PR/CA)

## (undated) DEVICE — GLOVE BIOGEL INDICATOR SZ 8 SURGICAL PF LTX - (50/BX 4BX/CA)

## (undated) DEVICE — Device

## (undated) DEVICE — DRILL BIT 15 MM FLUTELESS

## (undated) DEVICE — KIT EVACUATER 3 SPRING PVC LF 1/8 DRAIN SIZE (10EA/CA)"

## (undated) DEVICE — LACTATED RINGERS INJ 1000 ML - (14EA/CA 60CA/PF)

## (undated) DEVICE — SET LEADWIRE 5 LEAD BEDSIDE DISPOSABLE ECG (1SET OF 5/EA)

## (undated) DEVICE — ELECTRODE 850 FOAM ADHESIVE - HYDROGEL RADIOTRNSPRNT (50/PK)

## (undated) DEVICE — SET EXTENSION WITH 2 PORTS (48EA/CA) ***PART #2C8610 IS A SUBSTITUTE*****

## (undated) DEVICE — TUBING CLEARLINK DUO-VENT - C-FLO (48EA/CA)

## (undated) DEVICE — SUCTION INSTRUMENT YANKAUER BULBOUS TIP W/O VENT (50EA/CA)

## (undated) DEVICE — SODIUM CHL IRRIGATION 0.9% 1000ML (12EA/CA)

## (undated) DEVICE — SPONGE PEANUT - (5/PK 50PK/CA)

## (undated) DEVICE — TOWEL STOP TIMEOUT SAFETY FLAG (40EA/CA)

## (undated) DEVICE — SUTURE GENERAL

## (undated) DEVICE — LACTATED RINGERS INJ. 500 ML - (24EA/CA)

## (undated) DEVICE — SCREW DISTRACTION 14MM YELLOW - STERILE (10EA/BX) (5TX4=20)

## (undated) DEVICE — DRESSING TRANSPARENT FILM TEGADERM 4 X 4.75" (50EA/BX)"

## (undated) DEVICE — SUTURE 3-0 VICRYL PLUS RB-1 - 8 X 18 INCH (12/BX)

## (undated) DEVICE — NEPTUNE 4 PORT MANIFOLD - (20/PK)

## (undated) DEVICE — SLEEVE, VASO, THIGH, MED